# Patient Record
Sex: MALE | Race: WHITE | Employment: UNEMPLOYED | URBAN - METROPOLITAN AREA
[De-identification: names, ages, dates, MRNs, and addresses within clinical notes are randomized per-mention and may not be internally consistent; named-entity substitution may affect disease eponyms.]

---

## 2018-03-02 PROBLEM — Z86.79 H/O: HYPERTENSION: Status: ACTIVE | Noted: 2017-03-10

## 2018-03-02 PROBLEM — I35.0 NONRHEUMATIC AORTIC VALVE STENOSIS: Status: ACTIVE | Noted: 2017-03-10

## 2018-04-17 ENCOUNTER — OFFICE VISIT (OUTPATIENT)
Dept: ELECTROPHYSIOLOGY | Facility: HOSPITAL | Age: 63
End: 2018-04-17
Attending: INTERNAL MEDICINE
Payer: MEDICAID

## 2018-04-17 ENCOUNTER — APPOINTMENT (OUTPATIENT)
Dept: LAB | Age: 63
End: 2018-04-17
Attending: INTERNAL MEDICINE
Payer: MEDICAID

## 2018-04-17 DIAGNOSIS — M06.9 RHEUMATOID ARTHRITIS INVOLVING MULTIPLE SITES, UNSPECIFIED RHEUMATOID FACTOR PRESENCE: ICD-10-CM

## 2018-04-17 DIAGNOSIS — G56.03 BILATERAL CARPAL TUNNEL SYNDROME: ICD-10-CM

## 2018-04-17 DIAGNOSIS — R25.2 LEG CRAMPS: ICD-10-CM

## 2018-04-17 PROCEDURE — 86431 RHEUMATOID FACTOR QUANT: CPT

## 2018-04-17 PROCEDURE — 86140 C-REACTIVE PROTEIN: CPT

## 2018-04-17 PROCEDURE — 95886 MUSC TEST DONE W/N TEST COMP: CPT | Performed by: OTHER

## 2018-04-17 PROCEDURE — 86200 CCP ANTIBODY: CPT

## 2018-04-17 PROCEDURE — 36415 COLL VENOUS BLD VENIPUNCTURE: CPT

## 2018-04-17 PROCEDURE — 80048 BASIC METABOLIC PNL TOTAL CA: CPT

## 2018-04-17 PROCEDURE — 85652 RBC SED RATE AUTOMATED: CPT

## 2018-04-17 PROCEDURE — 95913 NRV CNDJ TEST 13/> STUDIES: CPT | Performed by: OTHER

## 2018-04-17 NOTE — PROCEDURES
Nerve Conduction/Electromyography Report      [facility]   EMG department  [facility]  [address]  [place], [address]  [phone]      Patient name: Angélica Lyon  YOB: 1955  Referring physician: Dr. Bennie Farias  Reason for study: Gabriella De Souzaemelina neuropathy. Serologic workup for polyneuropathy is suggested.   3.  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  4.  There is also suggestion of a superimposed right compressive ulnar mononeuropa

## 2018-05-04 PROBLEM — M54.50 CHRONIC BILATERAL LOW BACK PAIN WITHOUT SCIATICA: Status: ACTIVE | Noted: 2018-05-04

## 2018-05-04 PROBLEM — G89.29 CHRONIC BILATERAL LOW BACK PAIN WITHOUT SCIATICA: Status: ACTIVE | Noted: 2018-05-04

## 2018-05-04 PROBLEM — M54.2 POSTERIOR NECK PAIN: Status: ACTIVE | Noted: 2018-05-04

## 2018-05-04 PROBLEM — I70.0 ABDOMINAL AORTIC ATHEROSCLEROSIS (HCC): Status: ACTIVE | Noted: 2018-05-04

## 2018-06-25 PROCEDURE — 82043 UR ALBUMIN QUANTITATIVE: CPT | Performed by: INTERNAL MEDICINE

## 2018-06-25 PROCEDURE — 82570 ASSAY OF URINE CREATININE: CPT | Performed by: INTERNAL MEDICINE

## 2018-09-05 PROBLEM — E11.65 UNCONTROLLED TYPE 2 DIABETES MELLITUS WITH HYPERGLYCEMIA, WITH LONG-TERM CURRENT USE OF INSULIN (HCC): Status: ACTIVE | Noted: 2018-09-05

## 2018-09-05 PROBLEM — Z79.4 UNCONTROLLED TYPE 2 DIABETES MELLITUS WITH HYPERGLYCEMIA, WITH LONG-TERM CURRENT USE OF INSULIN (HCC): Status: ACTIVE | Noted: 2018-09-05

## 2018-09-05 PROBLEM — I10 ESSENTIAL HYPERTENSION: Status: ACTIVE | Noted: 2018-09-05

## 2018-09-28 PROBLEM — Z79.891 CHRONIC PRESCRIPTION OPIATE USE: Status: ACTIVE | Noted: 2018-09-28

## 2018-10-11 PROCEDURE — 82565 ASSAY OF CREATININE: CPT | Performed by: INTERNAL MEDICINE

## 2018-10-11 PROCEDURE — 82310 ASSAY OF CALCIUM: CPT | Performed by: INTERNAL MEDICINE

## 2018-10-11 PROCEDURE — 82570 ASSAY OF URINE CREATININE: CPT | Performed by: INTERNAL MEDICINE

## 2018-10-11 PROCEDURE — 83970 ASSAY OF PARATHORMONE: CPT | Performed by: INTERNAL MEDICINE

## 2018-10-11 PROCEDURE — 84156 ASSAY OF PROTEIN URINE: CPT | Performed by: INTERNAL MEDICINE

## 2018-10-11 PROCEDURE — 84100 ASSAY OF PHOSPHORUS: CPT | Performed by: INTERNAL MEDICINE

## 2018-10-11 PROCEDURE — 81001 URINALYSIS AUTO W/SCOPE: CPT | Performed by: INTERNAL MEDICINE

## 2018-10-11 PROCEDURE — 82043 UR ALBUMIN QUANTITATIVE: CPT | Performed by: INTERNAL MEDICINE

## 2018-10-26 PROCEDURE — 87340 HEPATITIS B SURFACE AG IA: CPT | Performed by: INTERNAL MEDICINE

## 2018-10-26 PROCEDURE — 87389 HIV-1 AG W/HIV-1&-2 AB AG IA: CPT | Performed by: INTERNAL MEDICINE

## 2018-10-26 PROCEDURE — 84165 PROTEIN E-PHORESIS SERUM: CPT | Performed by: INTERNAL MEDICINE

## 2018-10-26 PROCEDURE — 86160 COMPLEMENT ANTIGEN: CPT | Performed by: INTERNAL MEDICINE

## 2018-10-26 PROCEDURE — 86803 HEPATITIS C AB TEST: CPT | Performed by: INTERNAL MEDICINE

## 2018-10-26 PROCEDURE — 84540 ASSAY OF URINE/UREA-N: CPT | Performed by: INTERNAL MEDICINE

## 2018-10-26 PROCEDURE — 83883 ASSAY NEPHELOMETRY NOT SPEC: CPT | Performed by: INTERNAL MEDICINE

## 2018-10-26 PROCEDURE — 86334 IMMUNOFIX E-PHORESIS SERUM: CPT | Performed by: INTERNAL MEDICINE

## 2018-10-26 PROCEDURE — 86704 HEP B CORE ANTIBODY TOTAL: CPT | Performed by: INTERNAL MEDICINE

## 2018-10-26 PROCEDURE — 84300 ASSAY OF URINE SODIUM: CPT | Performed by: INTERNAL MEDICINE

## 2018-10-26 PROCEDURE — 86706 HEP B SURFACE ANTIBODY: CPT | Performed by: INTERNAL MEDICINE

## 2018-10-26 PROCEDURE — 86225 DNA ANTIBODY NATIVE: CPT | Performed by: INTERNAL MEDICINE

## 2018-10-26 PROCEDURE — 84156 ASSAY OF PROTEIN URINE: CPT | Performed by: INTERNAL MEDICINE

## 2018-10-26 PROCEDURE — 82570 ASSAY OF URINE CREATININE: CPT | Performed by: INTERNAL MEDICINE

## 2018-10-26 PROCEDURE — 86335 IMMUNFIX E-PHORSIS/URINE/CSF: CPT | Performed by: INTERNAL MEDICINE

## 2019-03-05 PROCEDURE — 84100 ASSAY OF PHOSPHORUS: CPT | Performed by: INTERNAL MEDICINE

## 2019-03-05 PROCEDURE — 82310 ASSAY OF CALCIUM: CPT | Performed by: INTERNAL MEDICINE

## 2019-03-05 PROCEDURE — 84156 ASSAY OF PROTEIN URINE: CPT | Performed by: INTERNAL MEDICINE

## 2019-03-05 PROCEDURE — 81001 URINALYSIS AUTO W/SCOPE: CPT | Performed by: INTERNAL MEDICINE

## 2019-03-05 PROCEDURE — 82565 ASSAY OF CREATININE: CPT | Performed by: INTERNAL MEDICINE

## 2019-03-05 PROCEDURE — 82570 ASSAY OF URINE CREATININE: CPT | Performed by: INTERNAL MEDICINE

## 2019-03-05 PROCEDURE — 83970 ASSAY OF PARATHORMONE: CPT | Performed by: INTERNAL MEDICINE

## 2019-07-05 PROBLEM — M15.9 PRIMARY OSTEOARTHRITIS INVOLVING MULTIPLE JOINTS: Status: ACTIVE | Noted: 2019-07-05

## 2019-08-01 ENCOUNTER — APPOINTMENT (OUTPATIENT)
Dept: GENERAL RADIOLOGY | Facility: HOSPITAL | Age: 64
DRG: 871 | End: 2019-08-01
Attending: EMERGENCY MEDICINE
Payer: MEDICAID

## 2019-08-01 ENCOUNTER — APPOINTMENT (OUTPATIENT)
Dept: CT IMAGING | Facility: HOSPITAL | Age: 64
DRG: 871 | End: 2019-08-01
Attending: EMERGENCY MEDICINE
Payer: MEDICAID

## 2019-08-01 ENCOUNTER — HOSPITAL ENCOUNTER (INPATIENT)
Facility: HOSPITAL | Age: 64
LOS: 3 days | Discharge: HOME | DRG: 871 | End: 2019-08-05
Attending: EMERGENCY MEDICINE | Admitting: HOSPITALIST
Payer: MEDICAID

## 2019-08-01 DIAGNOSIS — Z72.0 TOBACCO ABUSE: ICD-10-CM

## 2019-08-01 DIAGNOSIS — R09.02 HYPOXIA: ICD-10-CM

## 2019-08-01 DIAGNOSIS — N28.9 ACUTE RENAL INSUFFICIENCY: ICD-10-CM

## 2019-08-01 DIAGNOSIS — E87.1 HYPONATREMIA: ICD-10-CM

## 2019-08-01 DIAGNOSIS — R77.8 ELEVATED TROPONIN: ICD-10-CM

## 2019-08-01 DIAGNOSIS — I48.91 ATRIAL FIBRILLATION WITH RAPID VENTRICULAR RESPONSE (HCC): ICD-10-CM

## 2019-08-01 DIAGNOSIS — J18.1 CONSOLIDATION OF LEFT LOWER LOBE OF LUNG (HCC): Primary | ICD-10-CM

## 2019-08-01 PROBLEM — R73.9 HYPERGLYCEMIA: Status: ACTIVE | Noted: 2019-08-01

## 2019-08-01 PROBLEM — E87.3 METABOLIC ALKALOSIS: Status: ACTIVE | Noted: 2019-08-01

## 2019-08-01 PROBLEM — R79.89 AZOTEMIA: Status: ACTIVE | Noted: 2019-08-01

## 2019-08-01 PROBLEM — E87.3 RESPIRATORY ALKALOSIS: Status: ACTIVE | Noted: 2019-08-01

## 2019-08-01 PROBLEM — D69.6 THROMBOCYTOPENIA (HCC): Status: ACTIVE | Noted: 2019-08-01

## 2019-08-01 PROBLEM — N17.9 ACUTE KIDNEY INJURY (HCC): Status: ACTIVE | Noted: 2019-08-01

## 2019-08-01 LAB
ALBUMIN SERPL-MCNC: 2.6 G/DL (ref 3.4–5)
ALBUMIN/GLOB SERPL: 0.5 {RATIO} (ref 1–2)
ALLENS TEST: POSITIVE
ALP LIVER SERPL-CCNC: 68 U/L (ref 45–117)
ALT SERPL-CCNC: 43 U/L (ref 16–61)
ANION GAP SERPL CALC-SCNC: 10 MMOL/L (ref 0–18)
APTT PPP: 42.2 SECONDS (ref 25.4–36.1)
ARTERIAL BLD GAS O2 SATURATION: 98 % (ref 92–100)
ARTERIAL BLOOD GAS BASE EXCESS: -2 MMOL/L (ref ?–2)
ARTERIAL BLOOD GAS HCO3: 20.5 MEQ/L (ref 22–26)
ARTERIAL BLOOD GAS PCO2: 29 MM HG (ref 35–45)
ARTERIAL BLOOD GAS PH: 7.47 (ref 7.35–7.45)
ARTERIAL BLOOD GAS PO2: 427 MM HG (ref 80–105)
AST SERPL-CCNC: 73 U/L (ref 15–37)
BASOPHILS # BLD: 0 X10(3) UL (ref 0–0.2)
BASOPHILS NFR BLD: 0 %
BILIRUB SERPL-MCNC: 1 MG/DL (ref 0.1–2)
BUN BLD-MCNC: 52 MG/DL (ref 7–18)
BUN/CREAT SERPL: 21.4 (ref 10–20)
CALCIUM BLD-MCNC: 9.1 MG/DL (ref 8.5–10.1)
CALCULATED O2 SATURATION: 100 % (ref 92–100)
CARBOXYHEMOGLOBIN: 0.8 % SAT (ref 0–3)
CHLORIDE SERPL-SCNC: 92 MMOL/L (ref 98–112)
CO2 SERPL-SCNC: 26 MMOL/L (ref 21–32)
CREAT BLD-MCNC: 2.43 MG/DL (ref 0.7–1.3)
DEPRECATED RDW RBC AUTO: 41.1 FL (ref 35.1–46.3)
EOSINOPHIL # BLD: 0 X10(3) UL (ref 0–0.7)
EOSINOPHIL NFR BLD: 0 %
ERYTHROCYTE [DISTWIDTH] IN BLOOD BY AUTOMATED COUNT: 12.5 % (ref 11–15)
EXPIRATORY PRESSURE: 6 CM H2O
FIO2: 100 %
GLOBULIN PLAS-MCNC: 4.8 G/DL (ref 2.8–4.4)
GLUCOSE BLD-MCNC: 229 MG/DL (ref 70–99)
GLUCOSE BLD-MCNC: 245 MG/DL (ref 70–99)
HCT VFR BLD AUTO: 34.7 % (ref 39–53)
HGB BLD-MCNC: 12 G/DL (ref 13–17.5)
INR BLD: 1.61 (ref 0.9–1.1)
INSP PRESSURE: 14 CM H2O
LYMPHOCYTES NFR BLD: 0.16 X10(3) UL (ref 1–4)
LYMPHOCYTES NFR BLD: 2 %
M PROTEIN MFR SERPL ELPH: 7.4 G/DL (ref 6.4–8.2)
MCH RBC QN AUTO: 31.1 PG (ref 26–34)
MCHC RBC AUTO-ENTMCNC: 34.6 G/DL (ref 31–37)
MCV RBC AUTO: 89.9 FL (ref 80–100)
METHEMOGLOBIN: 0.6 % SAT (ref 0.4–1.5)
MONOCYTES # BLD: 0.32 X10(3) UL (ref 0.1–1)
MONOCYTES NFR BLD: 4 %
MORPHOLOGY: NORMAL
NEUTROPHILS # BLD AUTO: 7.26 X10 (3) UL (ref 1.5–7.7)
NEUTROPHILS NFR BLD: 43 %
NEUTS BAND NFR BLD: 51 %
NEUTS HYPERSEG # BLD: 7.43 X10(3) UL (ref 1.5–7.7)
OSMOLALITY SERPL CALC.SUM OF ELEC: 287 MOSM/KG (ref 275–295)
P/F RATIO: 424.5 MMHG
PATIENT TEMPERATURE: 99.5 F
PLATELET # BLD AUTO: 134 10(3)UL (ref 150–450)
PLATELET MORPHOLOGY: NORMAL
POTASSIUM SERPL-SCNC: 3.7 MMOL/L (ref 3.5–5.1)
PSA SERPL DL<=0.01 NG/ML-MCNC: 20 SECONDS (ref 12.5–14.7)
RBC # BLD AUTO: 3.86 X10(6)UL (ref 4.3–5.7)
SODIUM SERPL-SCNC: 128 MMOL/L (ref 136–145)
TOTAL CELLS COUNTED: 100
TOTAL HEMOGLOBIN: 12 G/DL (ref 13.2–17.3)
TROPONIN I SERPL-MCNC: 0.1 NG/ML (ref ?–0.04)
WBC # BLD AUTO: 7.9 X10(3) UL (ref 4–11)

## 2019-08-01 PROCEDURE — 71045 X-RAY EXAM CHEST 1 VIEW: CPT | Performed by: EMERGENCY MEDICINE

## 2019-08-01 PROCEDURE — 71250 CT THORAX DX C-: CPT | Performed by: EMERGENCY MEDICINE

## 2019-08-01 RX ORDER — SODIUM CHLORIDE 9 MG/ML
125 INJECTION, SOLUTION INTRAVENOUS CONTINUOUS
Status: DISCONTINUED | OUTPATIENT
Start: 2019-08-01 | End: 2019-08-02

## 2019-08-01 RX ORDER — MORPHINE SULFATE 4 MG/ML
2 INJECTION, SOLUTION INTRAMUSCULAR; INTRAVENOUS ONCE
Status: COMPLETED | OUTPATIENT
Start: 2019-08-01 | End: 2019-08-01

## 2019-08-01 RX ORDER — MORPHINE SULFATE 4 MG/ML
INJECTION, SOLUTION INTRAMUSCULAR; INTRAVENOUS
Status: COMPLETED
Start: 2019-08-01 | End: 2019-08-01

## 2019-08-02 ENCOUNTER — APPOINTMENT (OUTPATIENT)
Dept: CV DIAGNOSTICS | Facility: HOSPITAL | Age: 64
DRG: 871 | End: 2019-08-02
Attending: INTERNAL MEDICINE
Payer: MEDICAID

## 2019-08-02 ENCOUNTER — APPOINTMENT (OUTPATIENT)
Dept: GENERAL RADIOLOGY | Facility: HOSPITAL | Age: 64
DRG: 871 | End: 2019-08-02
Attending: INTERNAL MEDICINE
Payer: MEDICAID

## 2019-08-02 PROBLEM — R09.02 HYPOXIA: Status: ACTIVE | Noted: 2019-08-02

## 2019-08-02 PROBLEM — N28.9 ACUTE RENAL INSUFFICIENCY: Status: ACTIVE | Noted: 2019-08-02

## 2019-08-02 PROBLEM — R77.8 ELEVATED TROPONIN: Status: ACTIVE | Noted: 2019-08-02

## 2019-08-02 PROBLEM — I48.91 ATRIAL FIBRILLATION WITH RAPID VENTRICULAR RESPONSE (HCC): Status: ACTIVE | Noted: 2019-08-02

## 2019-08-02 PROBLEM — Z72.0 TOBACCO ABUSE: Status: ACTIVE | Noted: 2019-08-02

## 2019-08-02 LAB
ADENOVIRUS PCR:: NEGATIVE
ALLENS TEST: POSITIVE
ANION GAP SERPL CALC-SCNC: 11 MMOL/L (ref 0–18)
ARTERIAL BLD GAS O2 SATURATION: 93 % (ref 92–100)
ARTERIAL BLOOD GAS BASE EXCESS: -2.4 MMOL/L (ref ?–2)
ARTERIAL BLOOD GAS HCO3: 21.7 MEQ/L (ref 22–26)
ARTERIAL BLOOD GAS PCO2: 36 MM HG (ref 35–45)
ARTERIAL BLOOD GAS PH: 7.4 (ref 7.35–7.45)
ARTERIAL BLOOD GAS PO2: 74 MM HG (ref 80–105)
ATRIAL RATE: 58 BPM
B PERT DNA SPEC QL NAA+PROBE: NEGATIVE
BILIRUB UR QL STRIP.AUTO: NEGATIVE
BUN BLD-MCNC: 64 MG/DL (ref 7–18)
BUN/CREAT SERPL: 22.9 (ref 10–20)
C PNEUM DNA SPEC QL NAA+PROBE: NEGATIVE
CALCIUM BLD-MCNC: 9.2 MG/DL (ref 8.5–10.1)
CALCULATED O2 SATURATION: 94 % (ref 92–100)
CARBOXYHEMOGLOBIN: 0.9 % SAT (ref 0–3)
CHLORIDE SERPL-SCNC: 95 MMOL/L (ref 98–112)
CO2 SERPL-SCNC: 23 MMOL/L (ref 21–32)
CORONAVIRUS 229E PCR:: NEGATIVE
CORONAVIRUS HKU1 PCR:: NEGATIVE
CORONAVIRUS NL63 PCR:: NEGATIVE
CORONAVIRUS OC43 PCR:: NEGATIVE
CREAT BLD-MCNC: 2.79 MG/DL (ref 0.7–1.3)
CREAT UR-SCNC: 137 MG/DL
DEPRECATED RDW RBC AUTO: 42.1 FL (ref 35.1–46.3)
ERYTHROCYTE [DISTWIDTH] IN BLOOD BY AUTOMATED COUNT: 12.7 % (ref 11–15)
EST. AVERAGE GLUCOSE BLD GHB EST-MCNC: 203 MG/DL (ref 68–126)
FLUAV RNA SPEC QL NAA+PROBE: NEGATIVE
FLUBV RNA SPEC QL NAA+PROBE: NEGATIVE
GLUCOSE BLD-MCNC: 227 MG/DL (ref 70–99)
GLUCOSE BLD-MCNC: 233 MG/DL (ref 70–99)
GLUCOSE BLD-MCNC: 245 MG/DL (ref 70–99)
GLUCOSE BLD-MCNC: 304 MG/DL (ref 70–99)
GLUCOSE BLD-MCNC: 331 MG/DL (ref 70–99)
GLUCOSE BLD-MCNC: 358 MG/DL (ref 70–99)
GLUCOSE UR STRIP.AUTO-MCNC: NEGATIVE MG/DL
HBA1C MFR BLD HPLC: 8.7 % (ref ?–5.7)
HCT VFR BLD AUTO: 34 % (ref 39–53)
HGB BLD-MCNC: 11.6 G/DL (ref 13–17.5)
INR BLD: 1.31 (ref 0.9–1.1)
KETONES UR STRIP.AUTO-MCNC: NEGATIVE MG/DL
L PNEUMO AG UR QL: POSITIVE
L/M: 3 L/MIN
LEUKOCYTE ESTERASE UR QL STRIP.AUTO: NEGATIVE
MCH RBC QN AUTO: 30.8 PG (ref 26–34)
MCHC RBC AUTO-ENTMCNC: 34.1 G/DL (ref 31–37)
MCV RBC AUTO: 90.2 FL (ref 80–100)
METAPNEUMOVIRUS PCR:: NEGATIVE
METHEMOGLOBIN: 0.6 % SAT (ref 0.4–1.5)
MYCOPLASMA PNEUMONIA PCR:: NEGATIVE
NITRITE UR QL STRIP.AUTO: NEGATIVE
OSMOLALITY SERPL CALC.SUM OF ELEC: 294 MOSM/KG (ref 275–295)
P AXIS: 162 DEGREES
P-R INTERVAL: 248 MS
PARAINFLUENZA 1 PCR:: NEGATIVE
PARAINFLUENZA 2 PCR:: NEGATIVE
PARAINFLUENZA 3 PCR:: NEGATIVE
PARAINFLUENZA 4 PCR:: NEGATIVE
PATIENT TEMPERATURE: 100.3 F
PH UR STRIP.AUTO: 5 [PH] (ref 4.5–8)
PLATELET # BLD AUTO: 126 10(3)UL (ref 150–450)
POTASSIUM SERPL-SCNC: 3.5 MMOL/L (ref 3.5–5.1)
PROCALCITONIN SERPL-MCNC: 13.8 NG/ML
PROT UR STRIP.AUTO-MCNC: 100 MG/DL
PSA SERPL DL<=0.01 NG/ML-MCNC: 16.9 SECONDS (ref 12.5–14.7)
Q-T INTERVAL: 358 MS
QRS DURATION: 94 MS
QTC CALCULATION (BEZET): 468 MS
R AXIS: -22 DEGREES
RBC # BLD AUTO: 3.77 X10(6)UL (ref 4.3–5.7)
RHINOVIRUS/ENTERO PCR:: NEGATIVE
RSV RNA SPEC QL NAA+PROBE: NEGATIVE
SODIUM SERPL-SCNC: 11 MMOL/L
SODIUM SERPL-SCNC: 129 MMOL/L (ref 136–145)
SP GR UR STRIP.AUTO: 1.02 (ref 1–1.03)
STREP PNEUMO ANTIGEN, URINE: NEGATIVE
T AXIS: 59 DEGREES
TOTAL HEMOGLOBIN: 11.5 G/DL (ref 13.2–17.3)
TROPONIN I SERPL-MCNC: 0.1 NG/ML (ref ?–0.04)
UROBILINOGEN UR STRIP.AUTO-MCNC: <2 MG/DL
VENTRICULAR RATE: 103 BPM
WBC # BLD AUTO: 7.1 X10(3) UL (ref 4–11)

## 2019-08-02 PROCEDURE — 93306 TTE W/DOPPLER COMPLETE: CPT | Performed by: INTERNAL MEDICINE

## 2019-08-02 PROCEDURE — 74018 RADEX ABDOMEN 1 VIEW: CPT | Performed by: INTERNAL MEDICINE

## 2019-08-02 PROCEDURE — 99291 CRITICAL CARE FIRST HOUR: CPT | Performed by: NURSE PRACTITIONER

## 2019-08-02 RX ORDER — SODIUM CHLORIDE 9 MG/ML
INJECTION, SOLUTION INTRAVENOUS CONTINUOUS
Status: DISCONTINUED | OUTPATIENT
Start: 2019-08-02 | End: 2019-08-02

## 2019-08-02 RX ORDER — METOPROLOL TARTRATE 50 MG/1
50 TABLET, FILM COATED ORAL
Status: DISCONTINUED | OUTPATIENT
Start: 2019-08-02 | End: 2019-08-04

## 2019-08-02 RX ORDER — POTASSIUM CHLORIDE 14.9 MG/ML
20 INJECTION INTRAVENOUS ONCE
Status: COMPLETED | OUTPATIENT
Start: 2019-08-02 | End: 2019-08-02

## 2019-08-02 RX ORDER — ONDANSETRON 2 MG/ML
4 INJECTION INTRAMUSCULAR; INTRAVENOUS EVERY 4 HOURS PRN
Status: DISCONTINUED | OUTPATIENT
Start: 2019-08-02 | End: 2019-08-02 | Stop reason: HOSPADM

## 2019-08-02 RX ORDER — ONDANSETRON 2 MG/ML
4 INJECTION INTRAMUSCULAR; INTRAVENOUS EVERY 6 HOURS PRN
Status: DISCONTINUED | OUTPATIENT
Start: 2019-08-02 | End: 2019-08-05

## 2019-08-02 RX ORDER — IPRATROPIUM BROMIDE AND ALBUTEROL SULFATE 2.5; .5 MG/3ML; MG/3ML
3 SOLUTION RESPIRATORY (INHALATION) EVERY 6 HOURS PRN
Status: DISCONTINUED | OUTPATIENT
Start: 2019-08-02 | End: 2019-08-05

## 2019-08-02 RX ORDER — METOCLOPRAMIDE HYDROCHLORIDE 5 MG/ML
5 INJECTION INTRAMUSCULAR; INTRAVENOUS EVERY 8 HOURS PRN
Status: DISCONTINUED | OUTPATIENT
Start: 2019-08-02 | End: 2019-08-05

## 2019-08-02 RX ORDER — ACETAMINOPHEN 500 MG
1000 TABLET ORAL EVERY 6 HOURS PRN
Status: DISCONTINUED | OUTPATIENT
Start: 2019-08-02 | End: 2019-08-05

## 2019-08-02 RX ORDER — ATORVASTATIN CALCIUM 40 MG/1
40 TABLET, FILM COATED ORAL NIGHTLY
Status: DISCONTINUED | OUTPATIENT
Start: 2019-08-02 | End: 2019-08-05

## 2019-08-02 RX ORDER — CARVEDILOL 12.5 MG/1
25 TABLET ORAL 2 TIMES DAILY WITH MEALS
Status: DISCONTINUED | OUTPATIENT
Start: 2019-08-02 | End: 2019-08-02

## 2019-08-02 RX ORDER — IPRATROPIUM BROMIDE AND ALBUTEROL SULFATE 2.5; .5 MG/3ML; MG/3ML
3 SOLUTION RESPIRATORY (INHALATION)
Status: DISCONTINUED | OUTPATIENT
Start: 2019-08-02 | End: 2019-08-02

## 2019-08-02 RX ORDER — METHYLPREDNISOLONE SODIUM SUCCINATE 125 MG/2ML
60 INJECTION, POWDER, LYOPHILIZED, FOR SOLUTION INTRAMUSCULAR; INTRAVENOUS EVERY 8 HOURS
Status: DISCONTINUED | OUTPATIENT
Start: 2019-08-02 | End: 2019-08-05

## 2019-08-02 RX ORDER — DEXTROSE MONOHYDRATE 25 G/50ML
50 INJECTION, SOLUTION INTRAVENOUS
Status: DISCONTINUED | OUTPATIENT
Start: 2019-08-02 | End: 2019-08-05

## 2019-08-02 RX ORDER — SODIUM CHLORIDE 9 MG/ML
INJECTION, SOLUTION INTRAVENOUS CONTINUOUS
Status: DISCONTINUED | OUTPATIENT
Start: 2019-08-02 | End: 2019-08-04

## 2019-08-02 RX ORDER — HYDROCODONE BITARTRATE AND ACETAMINOPHEN 5; 325 MG/1; MG/1
1 TABLET ORAL EVERY 8 HOURS PRN
Status: DISCONTINUED | OUTPATIENT
Start: 2019-08-02 | End: 2019-08-04

## 2019-08-02 NOTE — ED PROVIDER NOTES
Patient Seen in: [facility] Emergency Department    History   Patient presents with:  Dyspnea MAYRA SOB (respiratory)    Stated Complaint: MAYRA    HPI    60 yo man presents to the emergency department from the [facility] urgent care, he went there with diffic Paramedics are transferring the patient to the bed, he is obese and tachypnea he is not on his CPAP, his HEENT exam reveals very dry oral mucosa his neck is supple without JVD his heart has a rapid irregularly irregular rhythm his abdomen is obese soft non HCT 34.7 (*)     .0 (*)     All other components within normal limits   CBC WITH DIFFERENTIAL WITH PLATELET    Narrative: The following orders were created for panel order CBC WITH DIFFERENTIAL WITH PLATELET.   Procedure window lymph node measures 1.0 x 0.6 cm (image     54). A representative paratracheal lymph node measures 1.3 x 0.8 cm     (image 53). CARDIAC:  Coronary arterial calcifications are noted. Mitral valve     calcifications are noted.     PLEURA:  Tiny le with RVR now stabilized on Cardizem drip, hypoxia was managed with BiPAP quite well, patient is a large left lung consolidation, given that he had a fever on Tuesday, possibly this is pneumonia however given his everyday smoking history of concern that [facility]

## 2019-08-02 NOTE — CONSULTS
[facility] Cardiology  Consultation Note      Junior Mondragon Patient Status:  Inpatient    1955 MRN [id number]   [facility] 4SW-A Attending Maxim Mata,    Hosp Day # 0 PCP Maggy Blue DO     Outpatient card Glucose-Vitamin C (DEX-4) 4-6 GM-MG chewable tab 4 tablet 4 tablet Oral Q15 Min PRN   Or      dextrose 50 % injection 50 mL 50 mL Intravenous Q15 Min PRN   Or      glucose (DEX4) oral liquid 30 g 30 g Oral Q15 Min PRN   Or      Glucose-Vitamin C (DEX-4) dyspnea on exertion  Cardiovascular: negative for chest pain  Gastrointestinal: negative for melena  Genitourinary:negative for hematuria  Hematologic/lymphatic: negative for bleeding  Musculoskeletal:negative for myalgias  Neurological: negative for dizzi 1.0 08/01/2019    TP 7.4 08/01/2019    AST 73 08/01/2019    ALT 43 08/01/2019    PTT 42.2 08/01/2019    INR 1.31 08/02/2019    PTP 16.9 08/02/2019    TROP 0.105 08/02/2019    PGLU 245 08/02/2019         Thank you for allowing our practice to participate in 177.8

## 2019-08-02 NOTE — PROGRESS NOTES
ICU  Critical Care APRN Progress Note    NAME: Sally Moreau - ROOM: [address] [place] [address] - MRN: [id number] - Age: 59year old - :1955    History Of Present Illness:  Sally Moreau is a 59year old male with PMHx significant for Afib on Xarelto, DM2, HTN review of systems was completed. Pertinent positives and negatives noted in the HPI.     OBJECTIVE  Vitals:  /75   Pulse 86   Temp 100.3 °F (37.9 °C) (Temporal)   Resp 14   Ht 175.3 cm (5' 9\")   Wt 207 lb 14.3 oz (94.3 kg)   SpO2 96%   BMI 30.70 kg/ Labs:  Lab Results   Component Value Date    WBC 7.9 08/01/2019    HGB 12.0 08/01/2019    HCT 34.7 08/01/2019    .0 08/01/2019    CREATSERUM 2.43 08/01/2019    BUN 52 08/01/2019     08/01/2019    K 3.7 08/01/2019    CL 92 08/01/2019

## 2019-08-02 NOTE — CONSULTS
[facility] - Nephrology  Report of Consultation    Cynthia Burns Patient Status:  Inpatient    1955 MRN [id number]   [facility] 4SW-A Attending Deisy Martínez DO   Hosp Day # 0 PCP Maggy Benitez DO     Reason for liquid 15 g, 15 g, Oral, Q15 Min PRN **OR** Glucose-Vitamin C (DEX-4) 4-6 GM-MG chewable tab 4 tablet, 4 tablet, Oral, Q15 Min PRN **OR** dextrose 50 % injection 50 mL, 50 mL, Intravenous, Q15 Min PRN **OR** glucose (DEX4) oral liquid 30 g, 30 g, Oral, Q15 1.8MG  SUBCUTANEOUSLY ONCE DAILY Disp: 27 mL Rfl: 1 8/1/2019 at Unknown time   insulin glargine (BASAGLAR KWIKPEN) 100 UNIT/ML Subcutaneous Solution Pen-injector INJECT 50 UNITS SUBCUTANEOUSLY ONCE DAILY Disp: 15 mL Rfl: 3 8/1/2019 at Unknown time   HYDROc (36.9 °C) (Temporal)   Resp 24   Ht 5' 9\" (1.753 m)   Wt 207 lb 14.3 oz (94.3 kg)   SpO2 99%   BMI 30.70 kg/m²   Temp (24hrs), Av.2 °F (37.9 °C), Min:98.5 °F (36.9 °C), Max:103.7 °F (39.8 °C)       Intake/Output Summary (Last 24 hours) at 2019 12 0.6 10/11/2018    NE 6.66 10/11/2018    LYMABS 1.95 10/11/2018    MOABSO 0.87 (H) 10/11/2018    EOABSO 0.23 10/11/2018    BAABSO 0.06 10/11/2018     Lab Results   Component Value Date    MALBP 15.4 03/05/2019    CREUR 45.78 03/05/2019    CREUR 42.10 03/05/

## 2019-08-02 NOTE — RESPIRATORY THERAPY NOTE
Patient taken off bipap and placed on 3L NC. Saturating into the mid to high 90's, no signs of distress. Will continue to monitor.

## 2019-08-02 NOTE — H&P
[facility] Hospitalist History and Physical      Patient presents with:  Dyspnea MAYRA SOB (respiratory)       PCP: Maggy Stewart DO      History of Present Illness: Patient is a 59year old male with PMH sig for afib, HTN, DM2 c/b diabetic retinopathy, and bleeding. OBJECTIVE:  BP 91/64   Pulse 82   Temp 98.5 °F (36.9 °C) (Temporal)   Resp 23   Ht 5' 9\" (1.753 m)   Wt 207 lb 14.3 oz (94.3 kg)   SpO2 100%   BMI 30.70 kg/m²   General:  Alert, no distress, appears stated age.      Head:  Normocephalic, with HISTORY: (As transcribed by Technologist)  MAYRA    FINDINGS:  LUNGS:  There is a large left upper lobe consolidation along with smaller patchy reticular nodular densities in consolidations in bilateral lower lobes.   Lesser extent reticular densities with mi complaints of difficulty breathing. FINDINGS:   Marked large consolidation throughout the left lung. Cardiac silhouette is mildly enlarged. No pneumothorax. CONCLUSION:  Large left lung consolidation.     Dictated by: Natty Ordaz MD on 8/01

## 2019-08-02 NOTE — RESPIRATORY THERAPY NOTE
Patient received in ER B2 on EMS CPAP mask. He was immediately switched to our Bipap 14/6/100%. Breath sounds diminished with crackles. ABG was drawn on Bipap:   ABG pH 7. 47High     ABG pCO2 29Low  mm Hg    ABG pO2 427High  mm Hg    ABG HCO3 20.5Low  mEq/L

## 2019-08-02 NOTE — PLAN OF CARE
Received pt. From ER on BIPAP. Switched NC and currently at [address] Lungs diminshed. Does IS up to 1500. Was intermittently confused last night about everything. Needed to repeat information multiple times. This am is completely alert and oriented x4.   R

## 2019-08-02 NOTE — PLAN OF CARE
Patient complaining of SOB placed back on Bi-pap Neb PRN given slight inspiratory wheeze noted on L. Will continue to monitor.

## 2019-08-02 NOTE — CONSULTS
Pulmonary H&P/Consult       NAME: Mayi Vega - ROOM: [id number] - MRN: [id number] - Age: 59year old - :  1955    Date of Admission: 2019  8:20 PM  Admission Diagnosis: Hyponatremia [E87.1]  Tobacco abuse [Z72.0]  Acute renal insufficiency daily. Disp:  Rfl:  8/1/2019 at Unknown time   Ondansetron HCl (ZOFRAN) 4 mg tablet Take 1 tablet (4 mg total) by mouth every 8 (eight) hours as needed for Nausea.  Disp: 20 tablet Rfl: 2 Past Week at Unknown time   metFORMIN HCl  MG Oral Tablet 24 Hr Disp:  Rfl:  More than a month at Unknown time   Glucose Blood (RITU CONTOUR TEST) In Vitro Strip Inject 1 each into the skin 2 (two) times daily. Disp: 100 strip Rfl: 3 Taking   TraMADol HCl 50 MG Oral Tab Take 1 tablet (50 mg total) by mouth daily.  Disp file    Social History Narrative      Not on file         Family History:  History reviewed. No pertinent family history. Home Medications:    Outpatient Medications Marked as Taking for the 8/1/19 encounter [facility] Encounter):   Multiple Vitamin (MUL Medication:ondansetron HCl, ondansetron HCl, Metoclopramide HCl, glucose **OR** Glucose-Vitamin C **OR** dextrose **OR** glucose **OR** Glucose-Vitamin C, ipratropium-albuterol, acetaminophen     REVIEW OF SYSTEMS:   GENERAL:  feels well otherwise   SKIN: teeth and gums normal   Neck:   Supple, symmetrical, trachea midline, no adenopathy;        thyroid:  No enlargement/tenderness/nodules; no carotid    bruit or JVD   Back:     Symmetric, no curvature, ROM normal, no CVA tenderness   Lungs:     Exp wheeze o left lung  -treat for CAP  -will add steroids given the extent of the disease  -monitor cultures and adjust abx accordingly  2. Hypoxia  -due to above  -wean O2 as tolerated  3.  Hyponatremia  -suspect this is due to lack of intake coupled w/ increased free

## 2019-08-03 LAB
ANION GAP SERPL CALC-SCNC: 9 MMOL/L (ref 0–18)
BILIRUB UR QL STRIP.AUTO: NEGATIVE
BUN BLD-MCNC: 80 MG/DL (ref 7–18)
BUN/CREAT SERPL: 26.9 (ref 10–20)
CALCIUM BLD-MCNC: 8.9 MG/DL (ref 8.5–10.1)
CHLORIDE SERPL-SCNC: 96 MMOL/L (ref 98–112)
CLARITY UR REFRACT.AUTO: CLEAR
CO2 SERPL-SCNC: 24 MMOL/L (ref 21–32)
COLOR UR AUTO: YELLOW
CREAT BLD-MCNC: 2.97 MG/DL (ref 0.7–1.3)
CREAT UR-SCNC: 55.3 MG/DL
CREAT UR-SCNC: 65.7 MG/DL
DEPRECATED RDW RBC AUTO: 42.6 FL (ref 35.1–46.3)
ERYTHROCYTE [DISTWIDTH] IN BLOOD BY AUTOMATED COUNT: 12.8 % (ref 11–15)
GLUCOSE BLD-MCNC: 241 MG/DL (ref 70–99)
GLUCOSE BLD-MCNC: 285 MG/DL (ref 70–99)
GLUCOSE BLD-MCNC: 332 MG/DL (ref 70–99)
GLUCOSE BLD-MCNC: 353 MG/DL (ref 70–99)
GLUCOSE BLD-MCNC: 508 MG/DL (ref 70–99)
GLUCOSE UR STRIP.AUTO-MCNC: >=500 MG/DL
HAV IGM SER QL: 2.9 MG/DL (ref 1.6–2.6)
HCT VFR BLD AUTO: 32 % (ref 39–53)
HGB BLD-MCNC: 10.7 G/DL (ref 13–17.5)
KETONES UR STRIP.AUTO-MCNC: NEGATIVE MG/DL
LEUKOCYTE ESTERASE UR QL STRIP.AUTO: NEGATIVE
MCH RBC QN AUTO: 30.7 PG (ref 26–34)
MCHC RBC AUTO-ENTMCNC: 33.4 G/DL (ref 31–37)
MCV RBC AUTO: 91.7 FL (ref 80–100)
MICROALBUMIN UR-MCNC: 23.5 MG/DL
MICROALBUMIN/CREAT 24H UR-RTO: 357.7 UG/MG (ref ?–30)
NITRITE UR QL STRIP.AUTO: NEGATIVE
OSMOLALITY SERPL CALC.SUM OF ELEC: 302 MOSM/KG (ref 275–295)
OSMOLALITY SERPL: 291 MOSM/KG (ref 280–300)
OSMOLALITY UR: 490 MOSM/KG (ref 300–1300)
PH UR STRIP.AUTO: 5 [PH] (ref 4.5–8)
PHOSPHATE SERPL-MCNC: 4.8 MG/DL (ref 2.5–4.9)
PLATELET # BLD AUTO: 119 10(3)UL (ref 150–450)
POTASSIUM SERPL-SCNC: 4.1 MMOL/L (ref 3.5–5.1)
POTASSIUM UR-SCNC: 15.8 MMOL/L
PROT UR STRIP.AUTO-MCNC: 30 MG/DL
RBC # BLD AUTO: 3.49 X10(6)UL (ref 4.3–5.7)
SODIUM SERPL-SCNC: 11 MMOL/L
SODIUM SERPL-SCNC: 129 MMOL/L (ref 136–145)
SP GR UR STRIP.AUTO: 1.01 (ref 1–1.03)
T3FREE SERPL-MCNC: 0.87 PG/ML (ref 2.4–4.2)
T4 FREE SERPL-MCNC: 1 NG/DL (ref 0.8–1.7)
TSI SER-ACNC: 0.29 MIU/ML (ref 0.36–3.74)
URATE SERPL-MCNC: 13.2 MG/DL (ref 3.5–7.2)
UROBILINOGEN UR STRIP.AUTO-MCNC: <2 MG/DL
WBC # BLD AUTO: 6.9 X10(3) UL (ref 4–11)

## 2019-08-03 RX ORDER — LISINOPRIL 20 MG/1
20 TABLET ORAL DAILY
Status: DISCONTINUED | OUTPATIENT
Start: 2019-08-03 | End: 2019-08-05

## 2019-08-03 RX ORDER — AMLODIPINE BESYLATE 2.5 MG/1
2.5 TABLET ORAL ONCE
Status: COMPLETED | OUTPATIENT
Start: 2019-08-03 | End: 2019-08-03

## 2019-08-03 NOTE — PROGRESS NOTES
Pulmonary Progress Note        NAME: Doris Mays - ROOM: 790/385-N - MRN: [id number] - Age: 59year old - : 1955        Last 24hrs: No events overnight, feeling better, still seems a little off/confused though he maintains that he's not    OBJE 08/03/19  0435   * 129* 129*   K 3.7 3.5 4.1   CL 92* 95* 96*   CO2 26.0 23.0 24.0   BUN 52* 64* 80*   CA 9.1 9.2 8.9   MG  --   --  2.9*   PHOS  --   --  4.8       Recent Labs     08/01/19 2030   ALT 43   AST 73*   ALB 2.6*       Invalid input(s):

## 2019-08-03 NOTE — PROGRESS NOTES
[facility] Cardiology Progress Note - Daren Bryan Patient Status:  Inpatient    1955 MRN [id number]   [facility] 4SW-A Attending Claudette Cade DO   Hosp Day # 1 PCP Maggy Hunter DO     Subjective: Chronic prescription opiate use     Primary osteoarthritis involving multiple joints     Hyponatremia     Thrombocytopenia (HCC)     Acute kidney injury ([address]     Metabolic alkalosis     Azotemia     Respiratory alkalosis     Hyperglycemia     Consolidatio Recent Labs   Lab 08/01/19  2030 08/02/19  0453 08/03/19  0435   * 129* 129*   K 3.7 3.5 4.1   CL 92* 95* 96*   CO2 26.0 23.0 24.0   BUN 52* 64* 80*   CREATSERUM 2.43* 2.79* 2.97*   CA 9.1 9.2 8.9   MG  --   --  2.9*   PHOS  --   --  4.8   GLU visual complaints or deficits  HEENT: denies nasal congestion, sinus pain; hearing loss negative  Respiratory: denies shortness of breath, wheezing or cough   Cardiovascular:  See HPI  GI: denies nausea, vomiting,   Genital/: no dysuria,   Musculoskeleta

## 2019-08-03 NOTE — CM/SW NOTE
SYLVIA acknowledged order for [facility] AT [facility].  MSW paged [facility]  P:[phone]  S:[phone] who will meet with the patient at bedside to explain services, financial disclosure and choice of agency.     Kelley Peng LCSW

## 2019-08-03 NOTE — PLAN OF CARE
Pt received this AM alert & oriented x 3, disoriented to day/year at times, able to follow commands. Lungs are diminished bilaterally on 2L nasal cannula. Afib w/ PVCs. BP stable. Pt diaphoretic, ice packs applied. Carb controlled diet-tolerating well.  Acc - FALL  Goal: Free from fall injury  Description  INTERVENTIONS:  - Assess pt frequently for physical needs  - Identify cognitive and physical deficits and behaviors that affect risk of falls. - [place] fall precautions as indicated by assessment.   - Ed patient to ask for assistance (call light)  - Provide an  as needed  - Communicate barriers and strategies to overcome with those who interact with patient  Outcome: Progressing     Problem: Diabetes/Glucose Control  Goal: Glucose maintained wit

## 2019-08-03 NOTE — HOME CARE LIAISON
Met with patient to offer home health at discharge. Patient wanted to speak with spouse later today. Brochure and contact information provided. Will update after speaking with spouse.

## 2019-08-03 NOTE — PROGRESS NOTES
[facility]    Nephrology Progress Note    Akilah Dawkins Attending:  Paula Mcneal MD     Cc: PILI    SUBJECTIVE     Feels better but po intake still not great  No sob or n/v/d    PHYSICAL EXAM   Vital signs: /84   Pulse 85   Temp 97.9 °F (36. NaCl infusion  Intravenous Continuous   ipratropium-albuterol (DUONEB) nebulizer solution 3 mL 3 mL Nebulization Q6H PRN   atorvastatin (LIPITOR) tab 40 mg 40 mg Oral Nightly   acetaminophen (TYLENOL EXTRA STRENGTH) tab 1,000 mg 1,000 mg Oral Q6H PRN   met volume was moderately increased. 5. Right atrium: The atrium was dilated. Impressions:  No previous study was available for comparison.   *    ASSESSMENT & PLAN   Manjit Jose is a 59year old male with past medical history significant for  DMII, HT

## 2019-08-03 NOTE — HOME CARE LIAISON
Attempted to call spouse and left voice message to offer home health. Waiting to hear back from Juliette Fernandez.

## 2019-08-03 NOTE — PLAN OF CARE
Vital signs stable overnight, able to wean NC 02 down to 3L. No complaints of shortness of breath. Patient diaphoretic throughout the night, no complaints of pain or chills. Low grade fever, tmax 99.8.  Patient up once to the bedside commode, patient states

## 2019-08-03 NOTE — PROGRESS NOTES
RECEIVED PT FROM ICU  TO  BY BED ACCOMPANIED BY ICU RN. PT ALERT AND AWAKE, BP ELEVATED 187/95,HR 97. 02 SATS 92% ON ROOM AIR, PT REPORTS SOB. PT PLACED ON 2LNC, SATS WENT UP TO 97%. PT ALSO NOTED DIAPHORETIC BUT AFEBRILE. PT DENIES ANY OTHER SY

## 2019-08-03 NOTE — PROGRESS NOTES
[facility] hospitalist daily note  Patient was seen/examined on 8/3/19    S; per pt he is feeling better  No chest  Pain, no fever, no abd pain, no nausea, no bleeding, no cough at this time      Medications in Epic    PE    08/03/19  1000   BP: 138/73   Pulse: 9

## 2019-08-04 LAB
ALBUMIN SERPL-MCNC: 2.4 G/DL (ref 3.4–5)
ANION GAP SERPL CALC-SCNC: 9 MMOL/L (ref 0–18)
BUN BLD-MCNC: 65 MG/DL (ref 7–18)
BUN/CREAT SERPL: 31.3 (ref 10–20)
CALCIUM BLD-MCNC: 9.8 MG/DL (ref 8.5–10.1)
CHLORIDE SERPL-SCNC: 105 MMOL/L (ref 98–112)
CO2 SERPL-SCNC: 21 MMOL/L (ref 21–32)
CREAT BLD-MCNC: 2.08 MG/DL (ref 0.7–1.3)
DEPRECATED RDW RBC AUTO: 44.4 FL (ref 35.1–46.3)
ERYTHROCYTE [DISTWIDTH] IN BLOOD BY AUTOMATED COUNT: 13.1 % (ref 11–15)
GLUCOSE BLD-MCNC: 128 MG/DL (ref 70–99)
GLUCOSE BLD-MCNC: 130 MG/DL (ref 70–99)
GLUCOSE BLD-MCNC: 209 MG/DL (ref 70–99)
GLUCOSE BLD-MCNC: 242 MG/DL (ref 70–99)
GLUCOSE BLD-MCNC: 303 MG/DL (ref 70–99)
GLUCOSE BLD-MCNC: 359 MG/DL (ref 70–99)
GLUCOSE BLD-MCNC: 367 MG/DL (ref 70–99)
GLUCOSE BLD-MCNC: 68 MG/DL (ref 70–99)
GLUCOSE BLD-MCNC: 70 MG/DL (ref 70–99)
GLUCOSE BLD-MCNC: 75 MG/DL (ref 70–99)
HAV IGM SER QL: 2.9 MG/DL (ref 1.6–2.6)
HCT VFR BLD AUTO: 36.7 % (ref 39–53)
HGB BLD-MCNC: 12.4 G/DL (ref 13–17.5)
MCH RBC QN AUTO: 31.3 PG (ref 26–34)
MCHC RBC AUTO-ENTMCNC: 33.8 G/DL (ref 31–37)
MCV RBC AUTO: 92.7 FL (ref 80–100)
OSMOLALITY SERPL CALC.SUM OF ELEC: 297 MOSM/KG (ref 275–295)
PHOSPHATE SERPL-MCNC: 3.3 MG/DL (ref 2.5–4.9)
PLATELET # BLD AUTO: 165 10(3)UL (ref 150–450)
POTASSIUM SERPL-SCNC: 3.4 MMOL/L (ref 3.5–5.1)
RBC # BLD AUTO: 3.96 X10(6)UL (ref 4.3–5.7)
SODIUM SERPL-SCNC: 135 MMOL/L (ref 136–145)
WBC # BLD AUTO: 18.4 X10(3) UL (ref 4–11)

## 2019-08-04 RX ORDER — HYDROCODONE BITARTRATE AND ACETAMINOPHEN 5; 325 MG/1; MG/1
1 TABLET ORAL EVERY 4 HOURS PRN
Status: DISCONTINUED | OUTPATIENT
Start: 2019-08-04 | End: 2019-08-05

## 2019-08-04 RX ORDER — TRAZODONE HYDROCHLORIDE 50 MG/1
25 TABLET ORAL NIGHTLY PRN
Status: DISCONTINUED | OUTPATIENT
Start: 2019-08-04 | End: 2019-08-05

## 2019-08-04 RX ORDER — AMLODIPINE BESYLATE 5 MG/1
5 TABLET ORAL DAILY
Status: DISCONTINUED | OUTPATIENT
Start: 2019-08-04 | End: 2019-08-05

## 2019-08-04 RX ORDER — HYDRALAZINE HYDROCHLORIDE 20 MG/ML
10 INJECTION INTRAMUSCULAR; INTRAVENOUS EVERY 4 HOURS PRN
Status: DISCONTINUED | OUTPATIENT
Start: 2019-08-04 | End: 2019-08-05

## 2019-08-04 RX ORDER — CARVEDILOL 12.5 MG/1
25 TABLET ORAL 2 TIMES DAILY WITH MEALS
Status: DISCONTINUED | OUTPATIENT
Start: 2019-08-04 | End: 2019-08-05

## 2019-08-04 RX ORDER — POTASSIUM CHLORIDE 20 MEQ/1
40 TABLET, EXTENDED RELEASE ORAL ONCE
Status: COMPLETED | OUTPATIENT
Start: 2019-08-04 | End: 2019-08-04

## 2019-08-04 NOTE — PLAN OF CARE
Problem: Patient/Family Goals  Goal: Patient/Family Long Term Goal  Description  Patient's Long Term Goal: discharge home with adequate resources    Interventions:  - comply with POC  - See additional Care Plan goals for specific interventions   Outcome: activity based on assessment  - Modify environment to reduce risk of injury  - Provide assistive devices as appropriate  - Consider OT/PT consult to assist with strengthening/mobility  - Encourage toileting schedule  Outcome: Progressing     Problem: [facility] day, abd is soft, +bs/gas, wctm. Pt up with standby assist, BRP, fall precautions in place. IVF infusing, IV abx. QID accucheck- see mar. Frequent rounding, needs met.  PT to eval.  Discharge plan TBD

## 2019-08-04 NOTE — CONSULTS
[facility]  Report of Consultation    Odilon Jaquez Patient Status:  Inpatient    1955 MRN [id number]   [facility] 5NW-A Attending Noah Palm MD   Hosp Day # 2 PCP Maggy Ludin Corporal, DO     Reason for Consultation:  Type Chloride ER (K-DUR M20) CR tab 40 mEq, 40 mEq, Oral, Once  •  azithromycin (ZITHROMAX) 500 mg in sodium chloride 0.9% 250 mL IVPB, 500 mg, Intravenous, Q24H  •  Insulin Aspart Pen (NOVOLOG) 100 UNIT/ML flexpen 1-50 Units, 1-50 Units, Subcutaneous, TID CC anicteric sclera, perrla, eomi  ENT: op clear, no lesions noted, MMM  Neck: supple, no thyromegaly  Lymph: no neck or supraclavicular lymphadenopathy  Cardiovascular:  RRR  Lungs: poor air movement   Abd: soft, nontender, nondistended, active bowel sounds insulin ([facility] [address]     Essential hypertension     Chronic prescription opiate use     Primary osteoarthritis involving multiple joints     Hyponatremia     Thrombocytopenia (HCC)     Acute kidney injury ([facility] [address]     Metabolic alkalosis     Azotemia     Respiratory

## 2019-08-04 NOTE — PROGRESS NOTES
[facility]    Nephrology Progress Note    Marlen Cedillo Attending:  Genie Osler, MD     Cc: PILI    SUBJECTIVE     BP elevated overnight, IVFs stopped, BP meds added  Drinking a lot of caffeine   No sob or n/v/d    PHYSICAL EXAM   Vital signs: BP Subcutaneous Once   ondansetron HCl (ZOFRAN) injection 4 mg 4 mg Intravenous Q6H PRN   Metoclopramide HCl (REGLAN) injection 5 mg 5 mg Intravenous Q8H PRN   glucose (DEX4) oral liquid 15 g 15 g Oral Q15 Min PRN   Or      Glucose-Vitamin C (DEX-4) 4-6 GM-MG valve: Mitral valve leaflets were poorly visualized. Rheumatic     mitral scarring can not be ruled out. Moderately calcified, moderately     fibrotic, moderately thickened annulus. Mitral valve demonstrates mildly     thickened, mildly calcified leaflets. care of this patient. Please do not hesitate to call with questions or concerns.        Damaris Smith MD  [address] [facility] Nephrology

## 2019-08-04 NOTE — PROGRESS NOTES
SP02 % ON ROOM AIR AT REST 95%  SP02 % AMBULATION ON ROOM AIR 81%  SPO2% AMBULATION ON 02 94% ON  4 LITERS PER MINUTE

## 2019-08-04 NOTE — PROGRESS NOTES
[facility] hospitalist daily note  Patient was seen/examined on 8/4/19     S; per pt he is feeling better  No chest  Pain, no fever, no abd pain, no nausea, no bleeding, no cough at this time  Per pt he did not sleep well and asking for sleping pill for tonight.

## 2019-08-04 NOTE — PROGRESS NOTES
08/04/19 0159   Provider Notification   Reason for Communication Patient request  (change frequency prn norco)   Provider Name Other (comment)  (Phuong)   Method of Communication Page   Response Waiting for response   Notification Time 0159   D

## 2019-08-04 NOTE — PHYSICAL THERAPY NOTE
PHYSICAL THERAPY EVALUATION - INPATIENT     Room Number: 865/055-E  Evaluation Date: 8/4/2019  Type of Evaluation: Initial  Physician Order: PT Eval and Treat    Presenting Problem: Pnuemonia  Reason for Therapy: Mobility Dysfunction and Discharge Pl reviewed. No pertinent surgical history.     HOME SITUATION  Type of Home: House   Home Layout: One level  Stairs to Enter : 2     Stairs to [employer]: 0       Lives With: Spouse  Drives: Yes  Patient Owned Equipment: None       Prior Level of [place]: I pain)     Comment : SOB when walking after 150' with desat to 85% after walking 300'    Skilled Therapy Provided: In bed and stated that he is feeling fine.  Pt on 2L/nc at 100% at rest. Gt training done on Ra with noted SOB after walking 150' and was noted is stable and overall the evaluation complexity is considered low. These impairments and comorbidities manifest themselves as functional limitations in independent bed mobility, transfers, and gait.   The patient is below baseline and would benefit from sk

## 2019-08-04 NOTE — PROGRESS NOTES
Pulmonary Progress Note        NAME: Sally Shock - ROOM: 707/520-C - MRN: [id number] - Age: 59year old - : 1955        Last 24hrs: No events overnight, continues to cough up blood tinged phlegm    OBJECTIVE:   19  0042  6. 9   HGB 12.0*  --  11.6* 10.7*   MCV 89.9  --  90.2 91.7   .0*  --  126.0* 119.0*   BAND 51  --   --   --    INR 1.61* 1.31*  --   --        Recent Labs     08/01/19  2030 08/02/19  0453 08/03/19  0435   * 129* 129*   K 3.7 3.5 4.1   CL 92*

## 2019-08-05 ENCOUNTER — APPOINTMENT (OUTPATIENT)
Dept: GENERAL RADIOLOGY | Facility: HOSPITAL | Age: 64
DRG: 871 | End: 2019-08-05
Attending: INTERNAL MEDICINE
Payer: MEDICAID

## 2019-08-05 VITALS
SYSTOLIC BLOOD PRESSURE: 155 MMHG | BODY MASS INDEX: 34.8 KG/M2 | RESPIRATION RATE: 18 BRPM | TEMPERATURE: 98 F | OXYGEN SATURATION: 98 % | HEART RATE: 92 BPM | HEIGHT: 69 IN | DIASTOLIC BLOOD PRESSURE: 86 MMHG | WEIGHT: 235 LBS

## 2019-08-05 LAB
ALBUMIN SERPL-MCNC: 2.2 G/DL (ref 3.4–5)
ANION GAP SERPL CALC-SCNC: 7 MMOL/L (ref 0–18)
BUN BLD-MCNC: 53 MG/DL (ref 7–18)
BUN/CREAT SERPL: 35.3 (ref 10–20)
CALCIUM BLD-MCNC: 9.8 MG/DL (ref 8.5–10.1)
CHLORIDE SERPL-SCNC: 109 MMOL/L (ref 98–112)
CO2 SERPL-SCNC: 20 MMOL/L (ref 21–32)
CREAT BLD-MCNC: 1.5 MG/DL (ref 0.7–1.3)
DEPRECATED RDW RBC AUTO: 45.5 FL (ref 35.1–46.3)
ERYTHROCYTE [DISTWIDTH] IN BLOOD BY AUTOMATED COUNT: 13.3 % (ref 11–15)
GLUCOSE BLD-MCNC: 154 MG/DL (ref 70–99)
GLUCOSE BLD-MCNC: 169 MG/DL (ref 70–99)
GLUCOSE BLD-MCNC: 222 MG/DL (ref 70–99)
HAV IGM SER QL: 2.7 MG/DL (ref 1.6–2.6)
HCT VFR BLD AUTO: 33.8 % (ref 39–53)
HGB BLD-MCNC: 11.4 G/DL (ref 13–17.5)
MCH RBC QN AUTO: 31.5 PG (ref 26–34)
MCHC RBC AUTO-ENTMCNC: 33.7 G/DL (ref 31–37)
MCV RBC AUTO: 93.4 FL (ref 80–100)
OSMOLALITY SERPL CALC.SUM OF ELEC: 299 MOSM/KG (ref 275–295)
PHOSPHATE SERPL-MCNC: 2.6 MG/DL (ref 2.5–4.9)
PLATELET # BLD AUTO: 114 10(3)UL (ref 150–450)
POTASSIUM SERPL-SCNC: 4.3 MMOL/L (ref 3.5–5.1)
RBC # BLD AUTO: 3.62 X10(6)UL (ref 4.3–5.7)
SODIUM SERPL-SCNC: 136 MMOL/L (ref 136–145)
WBC # BLD AUTO: 15.6 X10(3) UL (ref 4–11)

## 2019-08-05 PROCEDURE — 71046 X-RAY EXAM CHEST 2 VIEWS: CPT | Performed by: INTERNAL MEDICINE

## 2019-08-05 RX ORDER — AZITHROMYCIN 500 MG/1
500 TABLET, FILM COATED ORAL DAILY
Qty: 10 TABLET | Refills: 0 | Status: SHIPPED | OUTPATIENT
Start: 2019-08-05 | End: 2019-08-15

## 2019-08-05 RX ORDER — PREDNISONE 10 MG/1
TABLET ORAL
Qty: 20 TABLET | Refills: 0 | Status: SHIPPED | OUTPATIENT
Start: 2019-08-05 | End: 2019-08-23 | Stop reason: ALTCHOICE

## 2019-08-05 RX ORDER — PREDNISONE 20 MG/1
40 TABLET ORAL
Status: DISCONTINUED | OUTPATIENT
Start: 2019-08-06 | End: 2019-08-05

## 2019-08-05 RX ORDER — AMLODIPINE BESYLATE 5 MG/1
5 TABLET ORAL DAILY
Qty: 30 TABLET | Refills: 0 | Status: SHIPPED | OUTPATIENT
Start: 2019-08-06 | End: 2019-08-05

## 2019-08-05 NOTE — PROGRESS NOTES
[facility]  Progress Note    Jacky Berkowitz Patient Status:  Inpatient    1955 MRN [id number]   [facility] 5NW-A Attending Ameya Stanley MD   [facility] Day # 3 PCP Maggy Evans Minus, DO       SUBJECTIVE:  No acute events overnight LABS ORDERED    ----- Message from Cassie Diggs MD sent at 5/23/2018 12:53 PM EDT -----  Contact: patient  cmp flp tsh cbc vit d  ----- Message -----  From: Tabitha Young MA  Sent: 5/23/2018  11:35 AM  To: Cassie Diggs MD        ----- Message -----  From: Walter Jessica  Sent: 5/23/2018  11:14 AM  To: Tabitha Young MA    Patient will need lab orders for May 30th       Facility-Administered Medications:  HYDROcodone-acetaminophen (NORCO) 5-325 MG per tab 1 tablet 1 tablet Oral Q4H PRN   amLODIPine Besylate (NORVASC) tab 5 mg 5 mg Oral Daily   carvedilol (COREG) tab 25 mg 25 mg Oral BID with meals   insulin detemir (LEVEM daily at home monitored by Dr. Jamey Reilly  - exacerbated due to high doses of IV steroids   - continue levemir 50 units daily  - change to novolog 1:3g carbs   - continue novolog 1:10mg/dL qid   - manage hypoglycemia prn per protocol      2.  HTN - continue med

## 2019-08-05 NOTE — PROGRESS NOTES
[address] [facility] Cardiology  Progress Note    Manjit Jose Patient Status:  Inpatient    1955 MRN [id number]   [facility] 5NW-A Attending Laurita Allen MD   Hosp Day # 3 PCP Maggy Treadwell DO     Outpatient cardiologist: Facility-Administered Medications:  HYDROcodone-acetaminophen (NORCO) 5-325 MG per tab 1 tablet 1 tablet Oral Q4H PRN   amLODIPine Besylate (NORVASC) tab 5 mg 5 mg Oral Daily   carvedilol (COREG) tab 25 mg 25 mg Oral BID with meals   insulin detemir (LEVEM Date    INR 1.31 (H) 08/02/2019    INR 1.61 (H) 08/01/2019          Telemetry: No malignant tachyarrhythmias or bradyarrhythmias      Thank you for allowing our practice to participate in the care of your patient.  Please do not hesitate to contact me if yo

## 2019-08-05 NOTE — PLAN OF CARE
NURSING DISCHARGE NOTE    Discharged home via wheelchair. Accompanied by family. Belongings taken by patient. Tele & IV's d/c'd.  D/C instructions, meds, follow up appt explained to pt and family with understanding verbalized.   Pt given order for BM

## 2019-08-05 NOTE — PAYOR COMM NOTE
--------------  ADMISSION REVIEW     Payor: Jay Jay Peterson #:  [id number]  Authorization Number: [id number]    Admit date: 8/2/19  Admit time: [address]       Admitting Physician: José Tom MD  Attending Physician:  Bailey Griggs (5' 9\")   Wt 98.4 kg   SpO2 100%   BMI 32.05 kg/m²          Physical Exam    Pale 59year-old brought into the emergency department on emergency department bed.   Paramedics are transferring the patient to the bed, he is obese and tachypnea he is not on hi Glucose 245 (*)     All other components within normal limits   CBC W/ DIFFERENTIAL - Abnormal; Notable for the following components:    RBC 3.86 (*)     HGB 12.0 (*)     HCT 34.7 (*)     .0 (*)     All other components within normal limits   CBC WI intravenous contrast.      PRAVIN:  No mass or adenopathy. MEDIASTINUM:  Mildly prominent paratracheal and AP window lymph nodes are     noted. A representative AP window lymph node measures 1.0 x 0.6 cm (image     54).   A representative paratracheal l consolidation.                    Dictated by: Blane Mansfield MD on 8/01/2019 at 21:39         Approved by: Blane Mansfield MD                     [facility]   Patient with A. fib with RVR now stabilized on Cardizem drip, hypoxia was managed with BiPAP quit  (Physician) filed at 8/2/2019  1:26 PM         [facility] Hospitalist History and Physical      Patient presents with:  Dyspnea MAYRA SOB (respiratory)       PCP: Maggy Stewart DO      History of Present Illness: Patient is a 59year old male with PMH sig non-tender. Bowel sounds normal. No masses,  No organomegaly. Non distended   Extremities: Extremities normal, atraumatic, no cyanosis or edema. Skin: Skin color, texture, turgor normal. No rashes or lesions.     Neurologic: Moving all extremities spontan representative paratracheal lymph node measures 1.3 x 0.8 cm (image 53). CARDIAC:  Coronary arterial calcifications are noted. Mitral valve calcifications are noted. PLEURA:  Tiny left pleural effusion is noted.  THORACIC AORTA:  The ascending aorta is mil on CKD  #Hyponatremia  -S/p IVF  -renal consulted  -ace/mat held    #Afib with RVR  #Trop elevation/NSTEMI  -suspect trop elevated 2/2 demand  -cardiology consulted  -echo pending  -on xarelto and dilt gtt -- changing to oral BB    #Anemia/Thrombocytopen 100 UNIT/ML flexpen 1-50 Units     Date Action Dose Route User    8/4/2019 1755 Given 17 Units Subcutaneous (Left Upper Arm) Peggy Logan RN    8/4/2019 1222 Given 12 Units Subcutaneous (Left Upper Arm) Peggy Logan RN    8/4/2019 1023 Gi 08/03/19 0100 — 81 15 84/49Abnormal  98 % — — —    08/03/19 0000 98.4 °F (36.9 °C) 91 18 106/70 100 % — — —    08/02/19 2300 — 99 22 118/69 95 % — Nasal cannula 4 L/min      08/02/19 0314 103.7 °F (39.8 °C)Abnormal  93 31Abnormal  116/62 94 % — — —

## 2019-08-05 NOTE — PAYOR COMM NOTE
--------------  CONTINUED STAY REVIEW    Payor: Jay Jay Peterson #:  [id number]  Authorization Number: [id number]    Admit date: 8/2/19  Admit time: [address]    Admitting Physician: Sloan Miranda MD  Attending Physician:  DAVID IN LAST 1 DAY:  amLODIPine Besylate (NORVASC) tab 5 mg     Date Action Dose Route User    8/5/2019 0803 Given 5 mg Oral Conner Oh RN      atorvastatin (LIPITOR) tab 40 mg     Date Action Dose Route User    8/4/2019 2054 Given 40 mg Oral Atif Koenig Pippa Carroll RN      lisinopril (PRINIVIL,ZESTRIL) tab 20 mg     Date Action Dose Route User    8/5/2019 1350 Given 20 mg Oral Pippa Carroll RN      methylPREDNISolone Sodium Succ (Solu-MEDROL) injection 60 mg     Date Action Dose Route User    8/5/2019

## 2019-08-05 NOTE — PROGRESS NOTES
Pulmonary Progress Note        NAME: Cynthia Blocker - ROOM: 696/350-D - MRN: [id number] - Age: 59year old - : 1955        Last 24hrs: No events overnight, feels better today, not coughing as much, blood is less    OBJECTIVE:   19 89.9  --  90.2 91.7   .0*  --  126.0* 119.0*   BAND 51  --   --   --    INR 1.61* 1.31*  --   --        Recent Labs     08/01/19  2030 08/02/19  0453 08/03/19  0435   * 129* 129*   K 3.7 3.5 4.1   CL 92* 95* 96*   CO2 26.0 23.0 24.0   BUN 52*

## 2019-08-05 NOTE — PROGRESS NOTES
[facility] hospitalist daily note  Patient was seen/examined on 8/5/19     S; per pt he is feeling better  No chest  Pain, no fever, no abd pain, no nausea, no bleeding, no cough at this time  Wants to go home        Medications in Epic     PE    08/05/19  6489

## 2019-08-05 NOTE — PAYOR COMM NOTE
--------------  CONTINUED STAY REVIEW    Payor: Jay Jay Peterson #:  [id number]  Authorization Number: [id number]    Admit date: 8/2/19  Admit time: [address]    Admitting Physician: Sloan Miranda MD  Attending Physician:  DAVID rales in left base, few exp wheezes on right  Heart: S1, S2 normal, no murmur, click, rub or gallop, regular rate and rhythm  Abdomen: soft, non-tender; bowel sounds normal; no masses,  no organomegaly  Extremities: extremities normal, atraumatic, no cyano steroids given the extent of the disease  -repeat x-ray tomorrow to assess for progression vs improvement  2. Hypoxia  -due to above  -wean O2 as tolerated  3. Tob abuse  -encouraged to remain smoke free  -may need NRT  4. FEN  -diet as tolerated  5.  Proph (Left Upper Arm) Radha Thapa RN    8/4/2019 1222 Given 12 Units Subcutaneous (Left Upper Arm) Radha Thapa RN    8/4/2019 1023 Given 17 Units Subcutaneous (Left Lower Abdomen) Nerissa Garcia RN      Insulin Aspart Pen (NOVOLOG) 10

## 2019-08-05 NOTE — PROGRESS NOTES
[facility]    Nephrology Progress Note    Alyssia Quiñonez Attending:  Justyna Chaidez MD     Cc: PILI    SUBJECTIVE     Doing ok.    No sob or n/v/d    PHYSICAL EXAM   Vital signs: /86 (BP Location: Right arm)   Pulse 92   Temp 98 °F (36.7 °C) (Or Aspart Pen (NOVOLOG) 100 UNIT/ML flexpen 1-60 Units 1-60 Units Subcutaneous Once   ondansetron HCl (ZOFRAN) injection 4 mg 4 mg Intravenous Q6H PRN   Metoclopramide HCl (REGLAN) injection 5 mg 5 mg Intravenous Q8H PRN   glucose (DEX4) oral liquid 15 g 15 g scarring can not be ruled out. Moderately calcified, moderately     fibrotic, moderately thickened annulus. Mitral valve demonstrates mildly     thickened, mildly calcified leaflets. There was mild regurgitation. Mean     gradient (D): 3mm Hg.   4. Left atr RN    Thank you for allowing me to participate in the care of this patient. Please do not hesitate to call with questions or concerns.        Nayely Delgado MD  [address] [facility] Nephrology

## 2019-08-06 NOTE — DISCHARGE SUMMARY
[facility]  Discharge Summary    Aura Ruvalcaba Patient Status:  Inpatient    1955 MRN [id number]   [facility] 5NW-A Attending No att. providers found   Hosp Day # 3 PCP Maggy Ignacio DO     Date of Admission: 2019  steroid started per pulmonology  Pt getting better on Antibiotic.  Leukocytosis better     #PILI on CKD  #Hyponatremia  -renal consulted        #Afib with RVR  #Trop elevation/NSTEMI  -suspect trop elevated 2/2 demand  -cardiology consulted, suspect deman daily for 10 days. , Normal, Disp-10 tablet, R-0    Insulin Lispro (ADMELOG SOLOSTAR) 100 UNIT/ML Subcutaneous Solution Pen-injector  Take 1 unit per 10mg/dL >140mg/dL for corrections, Normal, Disp-15 mL, R-3      CONTINUE these medications which have NOT C Disp-200 each, R-3Please [facility].    !! Glucose Blood (DINAH CONTOUR TEST) In Vitro Strip  dinah counter next test strip- Checking daily, Normal, Disp-100 strip, R-3Dispense dinah contour next strip    Testo Other Discharge Instructions:   Please check blood work (BMP) on Wednesday and check with Dr. Mat Hernández if ok to resume lasix.     If BP less than 120/50, hold spironalactone  If BP less than 100/50, call MD  If BP greater than 180/100, seek medical at

## 2019-08-06 NOTE — PAYOR COMM NOTE
--------------  DISCHARGE REVIEW    Payor: Jay Jay Peterson #:  [id number]  Authorization Number: [id number]    Admit date: 8/2/19  Admit time:  1800  Discharge Date: 8/5/2019  4:02 PM     Admitting Physician: Cali Sinha 98 °F (36.7 °C)         Gen: awake, alert, oriented x slef, place, month, year, president, no acute  distress  HEENT; mmm, anicteric  CV:  nl S1/S2  Pulm:decreased breath sounds   Abd; soft, not tender, +BS  Ext: no calf tenderness b/l     Labs  Creatinine contacts. No dizziness or palpitations.  + cough + smoker         Consultations: Pulmonology, Cardiology, Nephrology, Endocrine      Procedures: please see Epic and hospital course    Complications: please see Epic and hospital course    Disposition: Home o (eight) hours as needed for Pain., Print Script, Disp-90 tablet, R-0    Rivaroxaban 20 MG Oral Tab  Take 20 mg by mouth., Historical    atorvastatin 40 MG Oral Tab  Historical    carvedilol 25 MG Oral Tab  Take 25 mg by mouth 2 (two) times daily with meals Internal Medicine  Contact information:  Kishor Aj MD.    Specialty:  ENDOCRINOLOGY  Contact information:  [address]  [address] [place] [address]  [phone] breathing, headache or visual disturbances  6. hives  7. persistent dizziness or light-headedness  8. extreme fatigue  9. Numbness/tingling  10.  Difficulty urinating or defecating  11. bleeding     Do not drive when taking pain medications  Do not exceed 4

## 2019-11-05 ENCOUNTER — HOSPITAL ENCOUNTER (EMERGENCY)
Facility: HOSPITAL | Age: 64
Discharge: HOME | End: 2019-11-05
Attending: EMERGENCY MEDICINE
Payer: MEDICAID

## 2019-11-05 VITALS
RESPIRATION RATE: 19 BRPM | TEMPERATURE: 98 F | HEART RATE: 89 BPM | DIASTOLIC BLOOD PRESSURE: 105 MMHG | OXYGEN SATURATION: 96 % | HEIGHT: 68 IN | WEIGHT: 211 LBS | SYSTOLIC BLOOD PRESSURE: 176 MMHG | BODY MASS INDEX: 31.98 KG/M2

## 2019-11-05 DIAGNOSIS — R89.9 ABNORMAL LABORATORY TEST: Primary | ICD-10-CM

## 2019-11-05 PROCEDURE — 99284 EMERGENCY DEPT VISIT MOD MDM: CPT

## 2019-11-05 PROCEDURE — 36415 COLL VENOUS BLD VENIPUNCTURE: CPT

## 2019-11-05 PROCEDURE — 93010 ELECTROCARDIOGRAM REPORT: CPT

## 2019-11-05 PROCEDURE — 93005 ELECTROCARDIOGRAM TRACING: CPT

## 2019-11-05 PROCEDURE — 99285 EMERGENCY DEPT VISIT HI MDM: CPT

## 2019-11-05 PROCEDURE — 80048 BASIC METABOLIC PNL TOTAL CA: CPT | Performed by: EMERGENCY MEDICINE

## 2019-11-06 NOTE — ED PROVIDER NOTES
Patient Seen in: [facility] Emergency Department      History   Patient presents with:  Abnormal Result (metabolic, cardiac)    Stated Complaint: elevated potassium    HPI    62year-old with a history of atrial fibrillation, hypertension, chronic k kg/m²         Physical Exam    General: Patient is awake, alert in no acute distress. HEENT:   Sclera are not icteric. Conjunctivae within normal limits. Mucous members are moist.   Cardiovascular: Irregularly irregular rhythm, normal S1-S2.   Respirato

## 2019-11-06 NOTE — ED INITIAL ASSESSMENT (HPI)
Pt sent to ED by Dr. Kyle Oconnell for abnormal labs drawn today. Pt has hx of renal disorder, potassium elevated at 6 per pt.

## 2020-07-14 PROBLEM — G89.29 OTHER CHRONIC PAIN: Status: ACTIVE | Noted: 2020-07-14

## 2020-11-10 PROBLEM — S13.4XXA WHIPLASH INJURY TO NECK, INITIAL ENCOUNTER: Status: ACTIVE | Noted: 2020-11-10

## 2020-11-10 PROBLEM — S43.421A SPRAIN OF RIGHT ROTATOR CUFF CAPSULE, INITIAL ENCOUNTER: Status: ACTIVE | Noted: 2020-11-10

## 2020-11-10 PROBLEM — M79.2 RADICULAR PAIN IN RIGHT ARM: Status: ACTIVE | Noted: 2020-11-10

## 2020-11-10 PROBLEM — S13.9XXS NECK SPRAIN, SEQUELA: Status: ACTIVE | Noted: 2020-11-10

## 2020-12-14 PROBLEM — R09.02 HYPOXIA: Status: RESOLVED | Noted: 2019-08-02 | Resolved: 2020-12-14

## 2020-12-14 PROBLEM — R77.8 ELEVATED TROPONIN: Status: RESOLVED | Noted: 2019-08-02 | Resolved: 2020-12-14

## 2020-12-14 PROBLEM — J18.1 CONSOLIDATION OF LEFT LOWER LOBE OF LUNG (HCC): Status: RESOLVED | Noted: 2019-08-01 | Resolved: 2020-12-14

## 2020-12-14 PROBLEM — R79.89 AZOTEMIA: Status: RESOLVED | Noted: 2019-08-01 | Resolved: 2020-12-14

## 2020-12-14 PROBLEM — M54.2 POSTERIOR NECK PAIN: Status: RESOLVED | Noted: 2018-05-04 | Resolved: 2020-12-14

## 2020-12-14 PROBLEM — S43.421A SPRAIN OF RIGHT ROTATOR CUFF CAPSULE, INITIAL ENCOUNTER: Status: RESOLVED | Noted: 2020-11-10 | Resolved: 2020-12-14

## 2020-12-14 PROBLEM — Z86.79 H/O: HYPERTENSION: Status: RESOLVED | Noted: 2017-03-10 | Resolved: 2020-12-14

## 2020-12-14 PROBLEM — M79.2 RADICULAR PAIN IN RIGHT ARM: Status: RESOLVED | Noted: 2020-11-10 | Resolved: 2020-12-14

## 2020-12-14 PROBLEM — N28.9 ACUTE RENAL INSUFFICIENCY: Status: RESOLVED | Noted: 2019-08-02 | Resolved: 2020-12-14

## 2020-12-14 PROBLEM — M54.50 CHRONIC BILATERAL LOW BACK PAIN WITHOUT SCIATICA: Status: RESOLVED | Noted: 2018-05-04 | Resolved: 2020-12-14

## 2020-12-14 PROBLEM — R73.9 HYPERGLYCEMIA: Status: RESOLVED | Noted: 2019-08-01 | Resolved: 2020-12-14

## 2020-12-14 PROBLEM — E87.3 METABOLIC ALKALOSIS: Status: RESOLVED | Noted: 2019-08-01 | Resolved: 2020-12-14

## 2020-12-14 PROBLEM — E87.1 HYPONATREMIA: Status: RESOLVED | Noted: 2019-08-01 | Resolved: 2020-12-14

## 2020-12-14 PROBLEM — E87.3 RESPIRATORY ALKALOSIS: Status: RESOLVED | Noted: 2019-08-01 | Resolved: 2020-12-14

## 2020-12-14 PROBLEM — R79.89 LOW TESTOSTERONE: Status: ACTIVE | Noted: 2020-12-14

## 2020-12-14 PROBLEM — G89.29 CHRONIC BILATERAL LOW BACK PAIN WITHOUT SCIATICA: Status: RESOLVED | Noted: 2018-05-04 | Resolved: 2020-12-14

## 2020-12-14 PROBLEM — N17.9 ACUTE KIDNEY INJURY (HCC): Status: RESOLVED | Noted: 2019-08-01 | Resolved: 2020-12-14

## 2020-12-14 PROBLEM — S13.9XXS NECK SPRAIN, SEQUELA: Status: RESOLVED | Noted: 2020-11-10 | Resolved: 2020-12-14

## 2020-12-14 PROBLEM — I10 ESSENTIAL HYPERTENSION: Status: RESOLVED | Noted: 2018-09-05 | Resolved: 2020-12-14

## 2020-12-14 PROBLEM — I48.91 ATRIAL FIBRILLATION WITH RAPID VENTRICULAR RESPONSE (HCC): Status: RESOLVED | Noted: 2019-08-02 | Resolved: 2020-12-14

## 2020-12-14 PROBLEM — S13.4XXA WHIPLASH INJURY TO NECK, INITIAL ENCOUNTER: Status: RESOLVED | Noted: 2020-11-10 | Resolved: 2020-12-14

## 2021-12-13 PROBLEM — S46.811D PARTIAL TEAR OF RIGHT SUBSCAPULARIS TENDON, SUBSEQUENT ENCOUNTER: Status: ACTIVE | Noted: 2021-01-01

## 2021-12-13 PROBLEM — M54.16 LEFT LUMBAR RADICULOPATHY: Status: ACTIVE | Noted: 2021-12-13

## 2021-12-13 PROBLEM — S43.003A SUBLUXATION OF TENDON OF LONG HEAD OF BICEPS: Status: ACTIVE | Noted: 2021-01-01

## 2021-12-13 PROBLEM — M54.16 LEFT LUMBAR RADICULOPATHY: Status: ACTIVE | Noted: 2021-01-01

## 2021-12-13 PROBLEM — S46.811D PARTIAL TEAR OF RIGHT SUBSCAPULARIS TENDON, SUBSEQUENT ENCOUNTER: Status: ACTIVE | Noted: 2021-12-13

## 2021-12-13 PROBLEM — S46.119A SUBLUXATION OF TENDON OF LONG HEAD OF BICEPS: Status: ACTIVE | Noted: 2021-12-13

## 2022-01-01 ENCOUNTER — APPOINTMENT (OUTPATIENT)
Dept: GENERAL RADIOLOGY | Facility: HOSPITAL | Age: 67
End: 2022-01-01
Attending: HOSPITALIST
Payer: MEDICAID

## 2022-01-01 ENCOUNTER — APPOINTMENT (OUTPATIENT)
Dept: CV DIAGNOSTICS | Facility: HOSPITAL | Age: 67
End: 2022-01-01
Attending: INTERNAL MEDICINE
Payer: MEDICAID

## 2022-01-01 ENCOUNTER — APPOINTMENT (OUTPATIENT)
Dept: GENERAL RADIOLOGY | Facility: HOSPITAL | Age: 67
End: 2022-01-01
Attending: INTERNAL MEDICINE
Payer: MEDICAID

## 2022-01-01 ENCOUNTER — APPOINTMENT (OUTPATIENT)
Dept: CV DIAGNOSTICS | Facility: HOSPITAL | Age: 67
DRG: 208 | End: 2022-01-01
Attending: INTERNAL MEDICINE
Payer: MEDICAID

## 2022-01-01 ENCOUNTER — APPOINTMENT (OUTPATIENT)
Dept: GENERAL RADIOLOGY | Facility: HOSPITAL | Age: 67
DRG: 208 | End: 2022-01-01
Attending: INTERNAL MEDICINE
Payer: MEDICAID

## 2022-01-01 ENCOUNTER — APPOINTMENT (OUTPATIENT)
Dept: GENERAL RADIOLOGY | Facility: HOSPITAL | Age: 67
End: 2022-01-01
Attending: PHYSICIAN ASSISTANT
Payer: MEDICAID

## 2022-01-01 ENCOUNTER — APPOINTMENT (OUTPATIENT)
Dept: CT IMAGING | Facility: HOSPITAL | Age: 67
DRG: 208 | End: 2022-01-01
Attending: NURSE PRACTITIONER
Payer: MEDICAID

## 2022-01-01 ENCOUNTER — APPOINTMENT (OUTPATIENT)
Dept: GENERAL RADIOLOGY | Facility: HOSPITAL | Age: 67
DRG: 208 | End: 2022-01-01
Attending: HOSPITALIST
Payer: MEDICAID

## 2022-01-01 ENCOUNTER — APPOINTMENT (OUTPATIENT)
Dept: GENERAL RADIOLOGY | Facility: HOSPITAL | Age: 67
DRG: 208 | End: 2022-01-01
Attending: NURSE PRACTITIONER
Payer: MEDICAID

## 2022-01-01 ENCOUNTER — APPOINTMENT (OUTPATIENT)
Dept: CT IMAGING | Facility: HOSPITAL | Age: 67
End: 2022-01-01
Attending: NURSE PRACTITIONER
Payer: MEDICAID

## 2022-01-01 ENCOUNTER — APPOINTMENT (OUTPATIENT)
Dept: CT IMAGING | Facility: HOSPITAL | Age: 67
DRG: 208 | End: 2022-01-01
Attending: INTERNAL MEDICINE
Payer: MEDICAID

## 2022-01-01 ENCOUNTER — APPOINTMENT (OUTPATIENT)
Dept: GENERAL RADIOLOGY | Facility: HOSPITAL | Age: 67
End: 2022-01-01
Attending: NURSE PRACTITIONER
Payer: MEDICAID

## 2022-01-01 ENCOUNTER — HOSPITAL ENCOUNTER (INPATIENT)
Facility: HOSPITAL | Age: 67
LOS: 17 days | End: 2022-01-01
Attending: INTERNAL MEDICINE | Admitting: INTERNAL MEDICINE
Payer: MEDICAID

## 2022-01-01 ENCOUNTER — HOSPITAL ENCOUNTER (INPATIENT)
Facility: HOSPITAL | Age: 67
LOS: 17 days | DRG: 208 | End: 2022-01-01
Attending: INTERNAL MEDICINE | Admitting: INTERNAL MEDICINE
Payer: MEDICAID

## 2022-01-01 ENCOUNTER — APPOINTMENT (OUTPATIENT)
Dept: MRI IMAGING | Facility: HOSPITAL | Age: 67
DRG: 208 | End: 2022-01-01
Attending: HOSPITALIST
Payer: MEDICAID

## 2022-01-01 ENCOUNTER — APPOINTMENT (OUTPATIENT)
Dept: CT IMAGING | Facility: HOSPITAL | Age: 67
End: 2022-01-01
Attending: INTERNAL MEDICINE
Payer: MEDICAID

## 2022-01-01 ENCOUNTER — APPOINTMENT (OUTPATIENT)
Dept: GENERAL RADIOLOGY | Facility: HOSPITAL | Age: 67
DRG: 208 | End: 2022-01-01
Attending: PHYSICIAN ASSISTANT
Payer: MEDICAID

## 2022-01-01 ENCOUNTER — APPOINTMENT (OUTPATIENT)
Dept: MRI IMAGING | Facility: HOSPITAL | Age: 67
End: 2022-01-01
Attending: HOSPITALIST
Payer: MEDICAID

## 2022-01-01 VITALS
RESPIRATION RATE: 31 BRPM | OXYGEN SATURATION: 76 % | DIASTOLIC BLOOD PRESSURE: 103 MMHG | WEIGHT: 235.25 LBS | BODY MASS INDEX: 36 KG/M2 | TEMPERATURE: 97 F | SYSTOLIC BLOOD PRESSURE: 117 MMHG

## 2022-01-01 LAB
ADENOVIRUS PCR:: NOT DETECTED
ADENOVIRUS PCR:: NOT DETECTED
ALBUMIN SERPL ELPH-MCNC: 2.7 G/DL (ref 3.75–5.21)
ALBUMIN SERPL-MCNC: 1.5 G/DL (ref 3.4–5)
ALBUMIN SERPL-MCNC: 1.5 G/DL (ref 3.4–5)
ALBUMIN SERPL-MCNC: 1.6 G/DL (ref 3.4–5)
ALBUMIN SERPL-MCNC: 1.7 G/DL (ref 3.4–5)
ALBUMIN SERPL-MCNC: 2 G/DL (ref 3.4–5)
ALBUMIN SERPL-MCNC: 2 G/DL (ref 3.4–5)
ALBUMIN SERPL-MCNC: 2.1 G/DL (ref 3.4–5)
ALBUMIN SERPL-MCNC: 2.2 G/DL (ref 3.4–5)
ALBUMIN SERPL-MCNC: 2.3 G/DL (ref 3.4–5)
ALBUMIN SERPL-MCNC: 2.4 G/DL (ref 3.4–5)
ALBUMIN SERPL-MCNC: 2.4 G/DL (ref 3.4–5)
ALBUMIN/GLOB SERPL: 0.4 {RATIO} (ref 1–2)
ALBUMIN/GLOB SERPL: 0.5 {RATIO} (ref 1–2)
ALBUMIN/GLOB SERPL: 0.6 {RATIO} (ref 1–2)
ALBUMIN/GLOB SERPL: 0.82 {RATIO} (ref 1–2)
ALP LIVER SERPL-CCNC: 105 U/L
ALP LIVER SERPL-CCNC: 108 U/L
ALP LIVER SERPL-CCNC: 121 U/L
ALP LIVER SERPL-CCNC: 159 U/L
ALP LIVER SERPL-CCNC: 166 U/L
ALP LIVER SERPL-CCNC: 173 U/L
ALP LIVER SERPL-CCNC: 178 U/L
ALP LIVER SERPL-CCNC: 201 U/L
ALP LIVER SERPL-CCNC: 98 U/L
ALP LIVER SERPL-CCNC: 99 U/L
ALPHA1 GLOB SERPL ELPH-MCNC: 0.51 G/DL (ref 0.19–0.46)
ALPHA2 GLOB SERPL ELPH-MCNC: 0.95 G/DL (ref 0.48–1.05)
ALT SERPL-CCNC: 31 U/L
ALT SERPL-CCNC: 32 U/L
ALT SERPL-CCNC: 32 U/L
ALT SERPL-CCNC: 34 U/L
ALT SERPL-CCNC: 35 U/L
ALT SERPL-CCNC: 36 U/L
ALT SERPL-CCNC: 37 U/L
ALT SERPL-CCNC: 43 U/L
ALT SERPL-CCNC: 45 U/L
ALT SERPL-CCNC: 65 U/L
AMMONIA PLAS-MCNC: 32 UMOL/L (ref 11–32)
AMMONIA PLAS-MCNC: 32 UMOL/L (ref 11–32)
ANION GAP SERPL CALC-SCNC: 0 MMOL/L (ref 0–18)
ANION GAP SERPL CALC-SCNC: 0 MMOL/L (ref 0–18)
ANION GAP SERPL CALC-SCNC: 1 MMOL/L (ref 0–18)
ANION GAP SERPL CALC-SCNC: 1 MMOL/L (ref 0–18)
ANION GAP SERPL CALC-SCNC: 10 MMOL/L (ref 0–18)
ANION GAP SERPL CALC-SCNC: 10 MMOL/L (ref 0–18)
ANION GAP SERPL CALC-SCNC: 3 MMOL/L (ref 0–18)
ANION GAP SERPL CALC-SCNC: 4 MMOL/L (ref 0–18)
ANION GAP SERPL CALC-SCNC: 5 MMOL/L (ref 0–18)
ANION GAP SERPL CALC-SCNC: 5 MMOL/L (ref 0–18)
ANION GAP SERPL CALC-SCNC: 6 MMOL/L (ref 0–18)
ANION GAP SERPL CALC-SCNC: 7 MMOL/L (ref 0–18)
ANION GAP SERPL CALC-SCNC: 8 MMOL/L (ref 0–18)
APTT PPP: 23 SECONDS (ref 23.3–35.6)
APTT PPP: 28.1 SECONDS (ref 23.3–35.6)
APTT PPP: 36.4 SECONDS (ref 23.3–35.6)
APTT PPP: 50.9 SECONDS (ref 23.3–35.6)
APTT PPP: 51.5 SECONDS (ref 23.3–35.6)
APTT PPP: 52.5 SECONDS (ref 23.3–35.6)
APTT PPP: 53.5 SECONDS (ref 23.3–35.6)
APTT PPP: 55.1 SECONDS (ref 23.3–35.6)
APTT PPP: 55.2 SECONDS (ref 23.3–35.6)
APTT PPP: 55.5 SECONDS (ref 23.3–35.6)
APTT PPP: 55.6 SECONDS (ref 23.3–35.6)
APTT PPP: 58 SECONDS (ref 23.3–35.6)
APTT PPP: 59.3 SECONDS (ref 23.3–35.6)
APTT PPP: 68.8 SECONDS (ref 23.3–35.6)
APTT PPP: 74 SECONDS (ref 23.3–35.6)
ARTERIAL PATENCY WRIST A: POSITIVE
AST SERPL-CCNC: 22 U/L (ref 15–37)
AST SERPL-CCNC: 25 U/L (ref 15–37)
AST SERPL-CCNC: 25 U/L (ref 15–37)
AST SERPL-CCNC: 27 U/L (ref 15–37)
AST SERPL-CCNC: 27 U/L (ref 15–37)
AST SERPL-CCNC: 28 U/L (ref 15–37)
AST SERPL-CCNC: 29 U/L (ref 15–37)
AST SERPL-CCNC: 30 U/L (ref 15–37)
AST SERPL-CCNC: 34 U/L (ref 15–37)
AST SERPL-CCNC: 89 U/L (ref 15–37)
ATRIAL RATE: 27 BPM
ATRIAL RATE: 86 BPM
B PARAPERT DNA SPEC QL NAA+PROBE: NOT DETECTED
B PARAPERT DNA SPEC QL NAA+PROBE: NOT DETECTED
B PERT DNA SPEC QL NAA+PROBE: NOT DETECTED
B PERT DNA SPEC QL NAA+PROBE: NOT DETECTED
B-GLOBULIN SERPL ELPH-MCNC: 0.8 G/DL (ref 0.68–1.23)
BASE EXCESS BLDA CALC-SCNC: -0.1 MMOL/L (ref ?–2)
BASE EXCESS BLDA CALC-SCNC: -1.3 MMOL/L (ref ?–2)
BASE EXCESS BLDA CALC-SCNC: -2.1 MMOL/L (ref ?–2)
BASE EXCESS BLDA CALC-SCNC: -4.2 MMOL/L (ref ?–2)
BASE EXCESS BLDA CALC-SCNC: -4.8 MMOL/L (ref ?–2)
BASE EXCESS BLDA CALC-SCNC: -5.1 MMOL/L (ref ?–2)
BASE EXCESS BLDA CALC-SCNC: 2.7 MMOL/L (ref ?–2)
BASE EXCESS BLDA CALC-SCNC: 4.7 MMOL/L (ref ?–2)
BASOPHILS # BLD AUTO: 0.01 X10(3) UL (ref 0–0.2)
BASOPHILS # BLD AUTO: 0.02 X10(3) UL (ref 0–0.2)
BASOPHILS # BLD AUTO: 0.03 X10(3) UL (ref 0–0.2)
BASOPHILS # BLD AUTO: 0.04 X10(3) UL (ref 0–0.2)
BASOPHILS # BLD AUTO: 0.04 X10(3) UL (ref 0–0.2)
BASOPHILS # BLD AUTO: 0.05 X10(3) UL (ref 0–0.2)
BASOPHILS # BLD: 0 X10(3) UL (ref 0–0.2)
BASOPHILS NFR BLD AUTO: 0.1 %
BASOPHILS NFR BLD AUTO: 0.1 %
BASOPHILS NFR BLD AUTO: 0.2 %
BASOPHILS NFR BLD AUTO: 0.3 %
BASOPHILS NFR BLD AUTO: 0.4 %
BASOPHILS NFR BLD: 0 %
BILIRUB SERPL-MCNC: 0.3 MG/DL (ref 0.1–2)
BILIRUB SERPL-MCNC: 0.3 MG/DL (ref 0.1–2)
BILIRUB SERPL-MCNC: 0.4 MG/DL (ref 0.1–2)
BILIRUB SERPL-MCNC: 0.4 MG/DL (ref 0.1–2)
BILIRUB SERPL-MCNC: 0.5 MG/DL (ref 0.1–2)
BILIRUB SERPL-MCNC: 0.6 MG/DL (ref 0.1–2)
BILIRUB SERPL-MCNC: 0.8 MG/DL (ref 0.1–2)
BILIRUB UR QL STRIP.AUTO: NEGATIVE
BODY TEMPERATURE: 97.4 F
BODY TEMPERATURE: 98.3 F
BODY TEMPERATURE: 98.6 F
BUN BLD-MCNC: 105 MG/DL (ref 7–18)
BUN BLD-MCNC: 110 MG/DL (ref 7–18)
BUN BLD-MCNC: 129 MG/DL (ref 7–18)
BUN BLD-MCNC: 132 MG/DL (ref 7–18)
BUN BLD-MCNC: 136 MG/DL (ref 7–18)
BUN BLD-MCNC: 47 MG/DL (ref 7–18)
BUN BLD-MCNC: 48 MG/DL (ref 7–18)
BUN BLD-MCNC: 48 MG/DL (ref 7–18)
BUN BLD-MCNC: 49 MG/DL (ref 7–18)
BUN BLD-MCNC: 51 MG/DL (ref 7–18)
BUN BLD-MCNC: 52 MG/DL (ref 7–18)
BUN BLD-MCNC: 52 MG/DL (ref 7–18)
BUN BLD-MCNC: 53 MG/DL (ref 7–18)
BUN BLD-MCNC: 53 MG/DL (ref 7–18)
BUN BLD-MCNC: 54 MG/DL (ref 7–18)
BUN BLD-MCNC: 55 MG/DL (ref 7–18)
BUN BLD-MCNC: 67 MG/DL (ref 7–18)
BUN BLD-MCNC: 67 MG/DL (ref 7–18)
BUN BLD-MCNC: 70 MG/DL (ref 7–18)
BUN BLD-MCNC: 71 MG/DL (ref 7–18)
BUN BLD-MCNC: 71 MG/DL (ref 7–18)
BUN BLD-MCNC: 82 MG/DL (ref 7–18)
BUN BLD-MCNC: 91 MG/DL (ref 7–18)
C PNEUM DNA SPEC QL NAA+PROBE: NOT DETECTED
C PNEUM DNA SPEC QL NAA+PROBE: NOT DETECTED
CA-I BLD-SCNC: 1.21 MMOL/L (ref 0.95–1.32)
CA-I BLD-SCNC: 1.22 MMOL/L (ref 0.95–1.32)
CA-I BLD-SCNC: 1.23 MMOL/L (ref 0.95–1.32)
CA-I BLD-SCNC: 1.26 MMOL/L (ref 0.95–1.32)
CA-I BLD-SCNC: 1.29 MMOL/L (ref 0.95–1.32)
CA-I BLD-SCNC: 1.53 MMOL/L (ref 0.95–1.32)
CALCIUM BLD-MCNC: 8 MG/DL (ref 8.5–10.1)
CALCIUM BLD-MCNC: 8.1 MG/DL (ref 8.5–10.1)
CALCIUM BLD-MCNC: 8.1 MG/DL (ref 8.5–10.1)
CALCIUM BLD-MCNC: 8.3 MG/DL (ref 8.5–10.1)
CALCIUM BLD-MCNC: 8.4 MG/DL (ref 8.5–10.1)
CALCIUM BLD-MCNC: 8.5 MG/DL (ref 8.5–10.1)
CALCIUM BLD-MCNC: 8.6 MG/DL (ref 8.5–10.1)
CALCIUM BLD-MCNC: 8.7 MG/DL (ref 8.5–10.1)
CALCIUM BLD-MCNC: 8.8 MG/DL (ref 8.5–10.1)
CALCIUM BLD-MCNC: 8.9 MG/DL (ref 8.5–10.1)
CALCIUM BLD-MCNC: 9 MG/DL (ref 8.5–10.1)
CALCIUM BLD-MCNC: 9 MG/DL (ref 8.5–10.1)
CALCIUM BLD-MCNC: 9.4 MG/DL (ref 8.5–10.1)
CALCIUM BLD-MCNC: 9.6 MG/DL (ref 8.5–10.1)
CALCIUM BLD-MCNC: 9.7 MG/DL (ref 8.5–10.1)
CHLORIDE SERPL-SCNC: 101 MMOL/L (ref 98–112)
CHLORIDE SERPL-SCNC: 103 MMOL/L (ref 98–112)
CHLORIDE SERPL-SCNC: 103 MMOL/L (ref 98–112)
CHLORIDE SERPL-SCNC: 105 MMOL/L (ref 98–112)
CHLORIDE SERPL-SCNC: 106 MMOL/L (ref 98–112)
CHLORIDE SERPL-SCNC: 106 MMOL/L (ref 98–112)
CHLORIDE SERPL-SCNC: 110 MMOL/L (ref 98–112)
CHLORIDE SERPL-SCNC: 112 MMOL/L (ref 98–112)
CHLORIDE SERPL-SCNC: 115 MMOL/L (ref 98–112)
CHLORIDE SERPL-SCNC: 115 MMOL/L (ref 98–112)
CHLORIDE SERPL-SCNC: 116 MMOL/L (ref 98–112)
CHLORIDE SERPL-SCNC: 118 MMOL/L (ref 98–112)
CHLORIDE SERPL-SCNC: 120 MMOL/L (ref 98–112)
CHLORIDE SERPL-SCNC: 121 MMOL/L (ref 98–112)
CHLORIDE SERPL-SCNC: 122 MMOL/L (ref 98–112)
CHOLEST SERPL-MCNC: 103 MG/DL (ref ?–200)
CO2 SERPL-SCNC: 20 MMOL/L (ref 21–32)
CO2 SERPL-SCNC: 21 MMOL/L (ref 21–32)
CO2 SERPL-SCNC: 22 MMOL/L (ref 21–32)
CO2 SERPL-SCNC: 22 MMOL/L (ref 21–32)
CO2 SERPL-SCNC: 23 MMOL/L (ref 21–32)
CO2 SERPL-SCNC: 24 MMOL/L (ref 21–32)
CO2 SERPL-SCNC: 25 MMOL/L (ref 21–32)
CO2 SERPL-SCNC: 25 MMOL/L (ref 21–32)
CO2 SERPL-SCNC: 26 MMOL/L (ref 21–32)
CO2 SERPL-SCNC: 27 MMOL/L (ref 21–32)
CO2 SERPL-SCNC: 28 MMOL/L (ref 21–32)
CO2 SERPL-SCNC: 30 MMOL/L (ref 21–32)
CO2 SERPL-SCNC: 31 MMOL/L (ref 21–32)
CO2 SERPL-SCNC: 35 MMOL/L (ref 21–32)
COHGB MFR BLD: 0.2 % SAT (ref 0–3)
COHGB MFR BLD: 0.3 % SAT (ref 0–3)
COHGB MFR BLD: 0.4 % SAT (ref 0–3)
COHGB MFR BLD: 1 % SAT (ref 0–3)
COHGB MFR BLD: 1.1 % SAT (ref 0–3)
COHGB MFR BLD: 1.2 % SAT (ref 0–3)
COHGB MFR BLD: 1.6 % SAT (ref 0–3)
COHGB MFR BLD: 1.7 % SAT (ref 0–3)
COLOR UR AUTO: YELLOW
CORONAVIRUS 229E PCR:: NOT DETECTED
CORONAVIRUS 229E PCR:: NOT DETECTED
CORONAVIRUS HKU1 PCR:: NOT DETECTED
CORONAVIRUS HKU1 PCR:: NOT DETECTED
CORONAVIRUS NL63 PCR:: NOT DETECTED
CORONAVIRUS NL63 PCR:: NOT DETECTED
CORONAVIRUS OC43 PCR:: NOT DETECTED
CORONAVIRUS OC43 PCR:: NOT DETECTED
CREAT BLD-MCNC: 1.69 MG/DL
CREAT BLD-MCNC: 1.74 MG/DL
CREAT BLD-MCNC: 1.75 MG/DL
CREAT BLD-MCNC: 1.8 MG/DL
CREAT BLD-MCNC: 1.91 MG/DL
CREAT BLD-MCNC: 1.95 MG/DL
CREAT BLD-MCNC: 1.96 MG/DL
CREAT BLD-MCNC: 2 MG/DL
CREAT BLD-MCNC: 2.02 MG/DL
CREAT BLD-MCNC: 2.03 MG/DL
CREAT BLD-MCNC: 2.15 MG/DL
CREAT BLD-MCNC: 2.3 MG/DL
CREAT BLD-MCNC: 2.38 MG/DL
CREAT BLD-MCNC: 2.38 MG/DL
CREAT BLD-MCNC: 2.4 MG/DL
CREAT BLD-MCNC: 2.4 MG/DL
CREAT BLD-MCNC: 2.41 MG/DL
CREAT BLD-MCNC: 2.57 MG/DL
CREAT BLD-MCNC: 2.61 MG/DL
CREAT BLD-MCNC: 2.79 MG/DL
CREAT BLD-MCNC: 3.38 MG/DL
CREAT BLD-MCNC: 3.4 MG/DL
CREAT BLD-MCNC: 3.68 MG/DL
CREAT UR-SCNC: 42.1 MG/DL
CREAT UR-SCNC: 44.2 MG/DL
DEPRECATED HBV CORE AB SER IA-ACNC: 354.5 NG/ML
EOSINOPHIL # BLD AUTO: 0.05 X10(3) UL (ref 0–0.7)
EOSINOPHIL # BLD AUTO: 0.18 X10(3) UL (ref 0–0.7)
EOSINOPHIL # BLD AUTO: 0.18 X10(3) UL (ref 0–0.7)
EOSINOPHIL # BLD AUTO: 0.19 X10(3) UL (ref 0–0.7)
EOSINOPHIL # BLD AUTO: 0.24 X10(3) UL (ref 0–0.7)
EOSINOPHIL # BLD AUTO: 0.25 X10(3) UL (ref 0–0.7)
EOSINOPHIL # BLD AUTO: 0.26 X10(3) UL (ref 0–0.7)
EOSINOPHIL # BLD AUTO: 0.28 X10(3) UL (ref 0–0.7)
EOSINOPHIL # BLD AUTO: 0.29 X10(3) UL (ref 0–0.7)
EOSINOPHIL # BLD AUTO: 0.58 X10(3) UL (ref 0–0.7)
EOSINOPHIL # BLD: 0.15 X10(3) UL (ref 0–0.7)
EOSINOPHIL # BLD: 0.17 X10(3) UL (ref 0–0.7)
EOSINOPHIL # BLD: 0.36 X10(3) UL (ref 0–0.7)
EOSINOPHIL NFR BLD AUTO: 0.4 %
EOSINOPHIL NFR BLD AUTO: 1.5 %
EOSINOPHIL NFR BLD AUTO: 1.6 %
EOSINOPHIL NFR BLD AUTO: 1.9 %
EOSINOPHIL NFR BLD AUTO: 2 %
EOSINOPHIL NFR BLD AUTO: 2 %
EOSINOPHIL NFR BLD AUTO: 2.3 %
EOSINOPHIL NFR BLD AUTO: 2.3 %
EOSINOPHIL NFR BLD AUTO: 2.5 %
EOSINOPHIL NFR BLD AUTO: 4.8 %
EOSINOPHIL NFR BLD: 1 %
EOSINOPHIL NFR BLD: 2 %
EOSINOPHIL NFR BLD: 3 %
ERYTHROCYTE [DISTWIDTH] IN BLOOD BY AUTOMATED COUNT: 12.5 %
ERYTHROCYTE [DISTWIDTH] IN BLOOD BY AUTOMATED COUNT: 12.5 %
ERYTHROCYTE [DISTWIDTH] IN BLOOD BY AUTOMATED COUNT: 12.6 %
ERYTHROCYTE [DISTWIDTH] IN BLOOD BY AUTOMATED COUNT: 12.9 %
ERYTHROCYTE [DISTWIDTH] IN BLOOD BY AUTOMATED COUNT: 13.2 %
ERYTHROCYTE [DISTWIDTH] IN BLOOD BY AUTOMATED COUNT: 13.2 %
ERYTHROCYTE [DISTWIDTH] IN BLOOD BY AUTOMATED COUNT: 13.3 %
ERYTHROCYTE [DISTWIDTH] IN BLOOD BY AUTOMATED COUNT: 13.3 %
ERYTHROCYTE [DISTWIDTH] IN BLOOD BY AUTOMATED COUNT: 13.4 %
ERYTHROCYTE [DISTWIDTH] IN BLOOD BY AUTOMATED COUNT: 13.4 %
ERYTHROCYTE [DISTWIDTH] IN BLOOD BY AUTOMATED COUNT: 13.6 %
ERYTHROCYTE [DISTWIDTH] IN BLOOD BY AUTOMATED COUNT: 14 %
ERYTHROCYTE [DISTWIDTH] IN BLOOD BY AUTOMATED COUNT: 14.7 %
ERYTHROCYTE [DISTWIDTH] IN BLOOD BY AUTOMATED COUNT: 16 %
ERYTHROCYTE [DISTWIDTH] IN BLOOD BY AUTOMATED COUNT: 16.9 %
EST. AVERAGE GLUCOSE BLD GHB EST-MCNC: 272 MG/DL (ref 68–126)
FIO2: 100 %
FIO2: 100 %
FIO2: 40 %
FIO2: 80 %
FLUAV RNA SPEC QL NAA+PROBE: NOT DETECTED
FLUAV RNA SPEC QL NAA+PROBE: NOT DETECTED
FLUBV RNA SPEC QL NAA+PROBE: NOT DETECTED
FLUBV RNA SPEC QL NAA+PROBE: NOT DETECTED
GAMMA GLOB SERPL ELPH-MCNC: 1.03 G/DL (ref 0.62–1.7)
GFR SERPLBLD BASED ON 1.73 SQ M-ARVRAT: 17 ML/MIN/1.73M2 (ref 60–?)
GFR SERPLBLD BASED ON 1.73 SQ M-ARVRAT: 19 ML/MIN/1.73M2 (ref 60–?)
GFR SERPLBLD BASED ON 1.73 SQ M-ARVRAT: 19 ML/MIN/1.73M2 (ref 60–?)
GFR SERPLBLD BASED ON 1.73 SQ M-ARVRAT: 24 ML/MIN/1.73M2 (ref 60–?)
GFR SERPLBLD BASED ON 1.73 SQ M-ARVRAT: 26 ML/MIN/1.73M2 (ref 60–?)
GFR SERPLBLD BASED ON 1.73 SQ M-ARVRAT: 27 ML/MIN/1.73M2 (ref 60–?)
GFR SERPLBLD BASED ON 1.73 SQ M-ARVRAT: 29 ML/MIN/1.73M2 (ref 60–?)
GFR SERPLBLD BASED ON 1.73 SQ M-ARVRAT: 30 ML/MIN/1.73M2 (ref 60–?)
GFR SERPLBLD BASED ON 1.73 SQ M-ARVRAT: 30 ML/MIN/1.73M2 (ref 60–?)
GFR SERPLBLD BASED ON 1.73 SQ M-ARVRAT: 31 ML/MIN/1.73M2 (ref 60–?)
GFR SERPLBLD BASED ON 1.73 SQ M-ARVRAT: 33 ML/MIN/1.73M2 (ref 60–?)
GFR SERPLBLD BASED ON 1.73 SQ M-ARVRAT: 36 ML/MIN/1.73M2 (ref 60–?)
GFR SERPLBLD BASED ON 1.73 SQ M-ARVRAT: 37 ML/MIN/1.73M2 (ref 60–?)
GFR SERPLBLD BASED ON 1.73 SQ M-ARVRAT: 37 ML/MIN/1.73M2 (ref 60–?)
GFR SERPLBLD BASED ON 1.73 SQ M-ARVRAT: 38 ML/MIN/1.73M2 (ref 60–?)
GFR SERPLBLD BASED ON 1.73 SQ M-ARVRAT: 41 ML/MIN/1.73M2 (ref 60–?)
GFR SERPLBLD BASED ON 1.73 SQ M-ARVRAT: 43 ML/MIN/1.73M2 (ref 60–?)
GFR SERPLBLD BASED ON 1.73 SQ M-ARVRAT: 43 ML/MIN/1.73M2 (ref 60–?)
GFR SERPLBLD BASED ON 1.73 SQ M-ARVRAT: 44 ML/MIN/1.73M2 (ref 60–?)
GLOBULIN PLAS-MCNC: 3.6 G/DL (ref 2.8–4.4)
GLOBULIN PLAS-MCNC: 3.7 G/DL (ref 2.8–4.4)
GLOBULIN PLAS-MCNC: 3.8 G/DL (ref 2.8–4.4)
GLOBULIN PLAS-MCNC: 3.8 G/DL (ref 2.8–4.4)
GLOBULIN PLAS-MCNC: 4 G/DL (ref 2.8–4.4)
GLOBULIN PLAS-MCNC: 4.1 G/DL (ref 2.8–4.4)
GLOBULIN PLAS-MCNC: 4.1 G/DL (ref 2.8–4.4)
GLOBULIN PLAS-MCNC: 4.2 G/DL (ref 2.8–4.4)
GLUCOSE BLD-MCNC: 102 MG/DL (ref 70–99)
GLUCOSE BLD-MCNC: 105 MG/DL (ref 70–99)
GLUCOSE BLD-MCNC: 108 MG/DL (ref 70–99)
GLUCOSE BLD-MCNC: 111 MG/DL (ref 70–99)
GLUCOSE BLD-MCNC: 112 MG/DL (ref 70–99)
GLUCOSE BLD-MCNC: 113 MG/DL (ref 70–99)
GLUCOSE BLD-MCNC: 117 MG/DL (ref 70–99)
GLUCOSE BLD-MCNC: 123 MG/DL (ref 70–99)
GLUCOSE BLD-MCNC: 125 MG/DL (ref 70–99)
GLUCOSE BLD-MCNC: 128 MG/DL (ref 70–99)
GLUCOSE BLD-MCNC: 129 MG/DL (ref 70–99)
GLUCOSE BLD-MCNC: 137 MG/DL (ref 70–99)
GLUCOSE BLD-MCNC: 138 MG/DL (ref 70–99)
GLUCOSE BLD-MCNC: 138 MG/DL (ref 70–99)
GLUCOSE BLD-MCNC: 139 MG/DL (ref 70–99)
GLUCOSE BLD-MCNC: 142 MG/DL (ref 70–99)
GLUCOSE BLD-MCNC: 143 MG/DL (ref 70–99)
GLUCOSE BLD-MCNC: 151 MG/DL (ref 70–99)
GLUCOSE BLD-MCNC: 152 MG/DL (ref 70–99)
GLUCOSE BLD-MCNC: 160 MG/DL (ref 70–99)
GLUCOSE BLD-MCNC: 165 MG/DL (ref 70–99)
GLUCOSE BLD-MCNC: 166 MG/DL (ref 70–99)
GLUCOSE BLD-MCNC: 167 MG/DL (ref 70–99)
GLUCOSE BLD-MCNC: 170 MG/DL (ref 70–99)
GLUCOSE BLD-MCNC: 172 MG/DL (ref 70–99)
GLUCOSE BLD-MCNC: 173 MG/DL (ref 70–99)
GLUCOSE BLD-MCNC: 173 MG/DL (ref 70–99)
GLUCOSE BLD-MCNC: 175 MG/DL (ref 70–99)
GLUCOSE BLD-MCNC: 177 MG/DL (ref 70–99)
GLUCOSE BLD-MCNC: 177 MG/DL (ref 70–99)
GLUCOSE BLD-MCNC: 178 MG/DL (ref 70–99)
GLUCOSE BLD-MCNC: 179 MG/DL (ref 70–99)
GLUCOSE BLD-MCNC: 181 MG/DL (ref 70–99)
GLUCOSE BLD-MCNC: 183 MG/DL (ref 70–99)
GLUCOSE BLD-MCNC: 184 MG/DL (ref 70–99)
GLUCOSE BLD-MCNC: 184 MG/DL (ref 70–99)
GLUCOSE BLD-MCNC: 188 MG/DL (ref 70–99)
GLUCOSE BLD-MCNC: 188 MG/DL (ref 70–99)
GLUCOSE BLD-MCNC: 190 MG/DL (ref 70–99)
GLUCOSE BLD-MCNC: 191 MG/DL (ref 70–99)
GLUCOSE BLD-MCNC: 191 MG/DL (ref 70–99)
GLUCOSE BLD-MCNC: 192 MG/DL (ref 70–99)
GLUCOSE BLD-MCNC: 193 MG/DL (ref 70–99)
GLUCOSE BLD-MCNC: 194 MG/DL (ref 70–99)
GLUCOSE BLD-MCNC: 196 MG/DL (ref 70–99)
GLUCOSE BLD-MCNC: 197 MG/DL (ref 70–99)
GLUCOSE BLD-MCNC: 198 MG/DL (ref 70–99)
GLUCOSE BLD-MCNC: 199 MG/DL (ref 70–99)
GLUCOSE BLD-MCNC: 199 MG/DL (ref 70–99)
GLUCOSE BLD-MCNC: 200 MG/DL (ref 70–99)
GLUCOSE BLD-MCNC: 202 MG/DL (ref 70–99)
GLUCOSE BLD-MCNC: 204 MG/DL (ref 70–99)
GLUCOSE BLD-MCNC: 205 MG/DL (ref 70–99)
GLUCOSE BLD-MCNC: 206 MG/DL (ref 70–99)
GLUCOSE BLD-MCNC: 206 MG/DL (ref 70–99)
GLUCOSE BLD-MCNC: 207 MG/DL (ref 70–99)
GLUCOSE BLD-MCNC: 208 MG/DL (ref 70–99)
GLUCOSE BLD-MCNC: 209 MG/DL (ref 70–99)
GLUCOSE BLD-MCNC: 210 MG/DL (ref 70–99)
GLUCOSE BLD-MCNC: 210 MG/DL (ref 70–99)
GLUCOSE BLD-MCNC: 211 MG/DL (ref 70–99)
GLUCOSE BLD-MCNC: 211 MG/DL (ref 70–99)
GLUCOSE BLD-MCNC: 213 MG/DL (ref 70–99)
GLUCOSE BLD-MCNC: 213 MG/DL (ref 70–99)
GLUCOSE BLD-MCNC: 219 MG/DL (ref 70–99)
GLUCOSE BLD-MCNC: 220 MG/DL (ref 70–99)
GLUCOSE BLD-MCNC: 221 MG/DL (ref 70–99)
GLUCOSE BLD-MCNC: 222 MG/DL (ref 70–99)
GLUCOSE BLD-MCNC: 226 MG/DL (ref 70–99)
GLUCOSE BLD-MCNC: 232 MG/DL (ref 70–99)
GLUCOSE BLD-MCNC: 234 MG/DL (ref 70–99)
GLUCOSE BLD-MCNC: 252 MG/DL (ref 70–99)
GLUCOSE BLD-MCNC: 256 MG/DL (ref 70–99)
GLUCOSE BLD-MCNC: 260 MG/DL (ref 70–99)
GLUCOSE BLD-MCNC: 263 MG/DL (ref 70–99)
GLUCOSE BLD-MCNC: 269 MG/DL (ref 70–99)
GLUCOSE BLD-MCNC: 274 MG/DL (ref 70–99)
GLUCOSE BLD-MCNC: 277 MG/DL (ref 70–99)
GLUCOSE BLD-MCNC: 282 MG/DL (ref 70–99)
GLUCOSE BLD-MCNC: 295 MG/DL (ref 70–99)
GLUCOSE BLD-MCNC: 298 MG/DL (ref 70–99)
GLUCOSE BLD-MCNC: 298 MG/DL (ref 70–99)
GLUCOSE BLD-MCNC: 315 MG/DL (ref 70–99)
GLUCOSE BLD-MCNC: 327 MG/DL (ref 70–99)
GLUCOSE BLD-MCNC: 344 MG/DL (ref 70–99)
GLUCOSE BLD-MCNC: 354 MG/DL (ref 70–99)
GLUCOSE BLD-MCNC: 363 MG/DL (ref 70–99)
GLUCOSE BLD-MCNC: 43 MG/DL (ref 70–99)
GLUCOSE BLD-MCNC: 50 MG/DL (ref 70–99)
GLUCOSE BLD-MCNC: 51 MG/DL (ref 70–99)
GLUCOSE BLD-MCNC: 84 MG/DL (ref 70–99)
GLUCOSE BLD-MCNC: 88 MG/DL (ref 70–99)
GLUCOSE BLD-MCNC: 96 MG/DL (ref 70–99)
GLUCOSE BLD-MCNC: 97 MG/DL (ref 70–99)
GLUCOSE UR STRIP.AUTO-MCNC: 100 MG/DL
HBA1C MFR BLD: 11.1 % (ref ?–5.7)
HCO3 BLDA-SCNC: 21 MEQ/L (ref 21–27)
HCO3 BLDA-SCNC: 21.2 MEQ/L (ref 21–27)
HCO3 BLDA-SCNC: 21.6 MEQ/L (ref 21–27)
HCO3 BLDA-SCNC: 23.2 MEQ/L (ref 21–27)
HCO3 BLDA-SCNC: 23.9 MEQ/L (ref 21–27)
HCO3 BLDA-SCNC: 24.8 MEQ/L (ref 21–27)
HCO3 BLDA-SCNC: 26.9 MEQ/L (ref 21–27)
HCO3 BLDA-SCNC: 28.1 MEQ/L (ref 21–27)
HCT VFR BLD AUTO: 24.7 %
HCT VFR BLD AUTO: 24.8 %
HCT VFR BLD AUTO: 26.1 %
HCT VFR BLD AUTO: 27.2 %
HCT VFR BLD AUTO: 27.2 %
HCT VFR BLD AUTO: 27.4 %
HCT VFR BLD AUTO: 27.6 %
HCT VFR BLD AUTO: 28.1 %
HCT VFR BLD AUTO: 28.5 %
HCT VFR BLD AUTO: 28.6 %
HCT VFR BLD AUTO: 29.2 %
HCT VFR BLD AUTO: 29.7 %
HCT VFR BLD AUTO: 30.7 %
HCT VFR BLD AUTO: 31.1 %
HCT VFR BLD AUTO: 35 %
HDLC SERPL-MCNC: 38 MG/DL (ref 40–59)
HGB BLD-MCNC: 10.1 G/DL
HGB BLD-MCNC: 10.2 G/DL
HGB BLD-MCNC: 10.3 G/DL
HGB BLD-MCNC: 10.3 G/DL
HGB BLD-MCNC: 10.5 G/DL
HGB BLD-MCNC: 10.9 G/DL
HGB BLD-MCNC: 7.5 G/DL
HGB BLD-MCNC: 7.9 G/DL
HGB BLD-MCNC: 8 G/DL
HGB BLD-MCNC: 8.6 G/DL
HGB BLD-MCNC: 8.7 G/DL
HGB BLD-MCNC: 8.7 G/DL
HGB BLD-MCNC: 8.9 G/DL
HGB BLD-MCNC: 9.2 G/DL
HGB BLD-MCNC: 9.3 G/DL
HGB BLD-MCNC: 9.4 G/DL
HGB BLD-MCNC: 9.5 G/DL
HGB BLD-MCNC: 9.5 G/DL
HGB BLD-MCNC: 9.9 G/DL
IMM GRANULOCYTES # BLD AUTO: 0.11 X10(3) UL (ref 0–1)
IMM GRANULOCYTES # BLD AUTO: 0.12 X10(3) UL (ref 0–1)
IMM GRANULOCYTES # BLD AUTO: 0.12 X10(3) UL (ref 0–1)
IMM GRANULOCYTES # BLD AUTO: 0.14 X10(3) UL (ref 0–1)
IMM GRANULOCYTES # BLD AUTO: 0.14 X10(3) UL (ref 0–1)
IMM GRANULOCYTES # BLD AUTO: 0.2 X10(3) UL (ref 0–1)
IMM GRANULOCYTES # BLD AUTO: 0.21 X10(3) UL (ref 0–1)
IMM GRANULOCYTES # BLD AUTO: 0.23 X10(3) UL (ref 0–1)
IMM GRANULOCYTES # BLD AUTO: 0.37 X10(3) UL (ref 0–1)
IMM GRANULOCYTES # BLD AUTO: 0.48 X10(3) UL (ref 0–1)
IMM GRANULOCYTES NFR BLD: 0.8 %
IMM GRANULOCYTES NFR BLD: 1 %
IMM GRANULOCYTES NFR BLD: 1 %
IMM GRANULOCYTES NFR BLD: 1.1 %
IMM GRANULOCYTES NFR BLD: 1.5 %
IMM GRANULOCYTES NFR BLD: 1.8 %
IMM GRANULOCYTES NFR BLD: 2 %
IMM GRANULOCYTES NFR BLD: 2.5 %
IMM GRANULOCYTES NFR BLD: 2.6 %
IMM GRANULOCYTES NFR BLD: 3.8 %
INR BLD: 1.35 (ref 0.85–1.16)
IRON SATN MFR SERPL: 31 %
IRON SERPL-MCNC: 59 UG/DL
KAPPA LC FREE SER-MCNC: 6.91 MG/DL (ref 0.33–1.94)
KAPPA LC FREE/LAMBDA FREE SER NEPH: 0.94 {RATIO} (ref 0.26–1.65)
KETONES UR STRIP.AUTO-MCNC: NEGATIVE MG/DL
L/M: 15 L/MIN
L/M: 2 L/MIN
L/M: 3 L/MIN
LACTATE BLD-SCNC: 0.6 MMOL/L (ref 0.5–2)
LACTATE BLD-SCNC: 0.8 MMOL/L (ref 0.5–2)
LACTATE BLD-SCNC: 0.8 MMOL/L (ref 0.5–2)
LACTATE BLD-SCNC: 5.6 MMOL/L (ref 0.5–2)
LACTATE BLD-SCNC: 5.9 MMOL/L (ref 0.5–2)
LAMBDA LC FREE SERPL-MCNC: 7.33 MG/DL (ref 0.57–2.63)
LDLC SERPL CALC-MCNC: 42 MG/DL (ref ?–100)
LEUKOCYTE ESTERASE UR QL STRIP.AUTO: NEGATIVE
LYMPHOCYTES # BLD AUTO: 0.56 X10(3) UL (ref 1–4)
LYMPHOCYTES # BLD AUTO: 0.6 X10(3) UL (ref 1–4)
LYMPHOCYTES # BLD AUTO: 0.65 X10(3) UL (ref 1–4)
LYMPHOCYTES # BLD AUTO: 0.66 X10(3) UL (ref 1–4)
LYMPHOCYTES # BLD AUTO: 0.71 X10(3) UL (ref 1–4)
LYMPHOCYTES # BLD AUTO: 0.72 X10(3) UL (ref 1–4)
LYMPHOCYTES # BLD AUTO: 0.76 X10(3) UL (ref 1–4)
LYMPHOCYTES # BLD AUTO: 0.9 X10(3) UL (ref 1–4)
LYMPHOCYTES # BLD AUTO: 0.92 X10(3) UL (ref 1–4)
LYMPHOCYTES # BLD AUTO: 0.93 X10(3) UL (ref 1–4)
LYMPHOCYTES NFR BLD AUTO: 4.4 %
LYMPHOCYTES NFR BLD AUTO: 4.4 %
LYMPHOCYTES NFR BLD AUTO: 4.5 %
LYMPHOCYTES NFR BLD AUTO: 5.4 %
LYMPHOCYTES NFR BLD AUTO: 6.4 %
LYMPHOCYTES NFR BLD AUTO: 7.2 %
LYMPHOCYTES NFR BLD AUTO: 7.4 %
LYMPHOCYTES NFR BLD AUTO: 7.5 %
LYMPHOCYTES NFR BLD AUTO: 8.4 %
LYMPHOCYTES NFR BLD AUTO: 8.8 %
LYMPHOCYTES NFR BLD: 0.48 X10(3) UL (ref 1–4)
LYMPHOCYTES NFR BLD: 0.69 X10(3) UL (ref 1–4)
LYMPHOCYTES NFR BLD: 0.89 X10(3) UL (ref 1–4)
LYMPHOCYTES NFR BLD: 4 %
LYMPHOCYTES NFR BLD: 6 %
LYMPHOCYTES NFR BLD: 8 %
MAGNESIUM SERPL-MCNC: 1.8 MG/DL (ref 1.6–2.6)
MAGNESIUM SERPL-MCNC: 1.9 MG/DL (ref 1.6–2.6)
MAGNESIUM SERPL-MCNC: 2.1 MG/DL (ref 1.6–2.6)
MAGNESIUM SERPL-MCNC: 2.2 MG/DL (ref 1.6–2.6)
MAGNESIUM SERPL-MCNC: 2.2 MG/DL (ref 1.6–2.6)
MAGNESIUM SERPL-MCNC: 2.3 MG/DL (ref 1.6–2.6)
MAGNESIUM SERPL-MCNC: 2.4 MG/DL (ref 1.6–2.6)
MAGNESIUM SERPL-MCNC: 2.4 MG/DL (ref 1.6–2.6)
MAGNESIUM SERPL-MCNC: 2.5 MG/DL (ref 1.6–2.6)
MAGNESIUM SERPL-MCNC: 2.5 MG/DL (ref 1.6–2.6)
MAGNESIUM SERPL-MCNC: 2.7 MG/DL (ref 1.6–2.6)
MAGNESIUM SERPL-MCNC: 2.8 MG/DL (ref 1.6–2.6)
MCH RBC QN AUTO: 30.6 PG (ref 26–34)
MCH RBC QN AUTO: 30.8 PG (ref 26–34)
MCH RBC QN AUTO: 30.9 PG (ref 26–34)
MCH RBC QN AUTO: 31 PG (ref 26–34)
MCH RBC QN AUTO: 31.1 PG (ref 26–34)
MCH RBC QN AUTO: 31.1 PG (ref 26–34)
MCH RBC QN AUTO: 31.2 PG (ref 26–34)
MCH RBC QN AUTO: 31.4 PG (ref 26–34)
MCH RBC QN AUTO: 31.4 PG (ref 26–34)
MCH RBC QN AUTO: 31.5 PG (ref 26–34)
MCH RBC QN AUTO: 31.6 PG (ref 26–34)
MCH RBC QN AUTO: 31.7 PG (ref 26–34)
MCHC RBC AUTO-ENTMCNC: 30 G/DL (ref 31–37)
MCHC RBC AUTO-ENTMCNC: 30.4 G/DL (ref 31–37)
MCHC RBC AUTO-ENTMCNC: 30.7 G/DL (ref 31–37)
MCHC RBC AUTO-ENTMCNC: 31.6 G/DL (ref 31–37)
MCHC RBC AUTO-ENTMCNC: 31.8 G/DL (ref 31–37)
MCHC RBC AUTO-ENTMCNC: 31.8 G/DL (ref 31–37)
MCHC RBC AUTO-ENTMCNC: 31.9 G/DL (ref 31–37)
MCHC RBC AUTO-ENTMCNC: 32 G/DL (ref 31–37)
MCHC RBC AUTO-ENTMCNC: 32 G/DL (ref 31–37)
MCHC RBC AUTO-ENTMCNC: 32.2 G/DL (ref 31–37)
MCHC RBC AUTO-ENTMCNC: 32.6 G/DL (ref 31–37)
MCHC RBC AUTO-ENTMCNC: 32.7 G/DL (ref 31–37)
MCHC RBC AUTO-ENTMCNC: 32.8 G/DL (ref 31–37)
MCHC RBC AUTO-ENTMCNC: 32.9 G/DL (ref 31–37)
MCHC RBC AUTO-ENTMCNC: 32.9 G/DL (ref 31–37)
MCV RBC AUTO: 101.6 FL
MCV RBC AUTO: 103.8 FL
MCV RBC AUTO: 105.7 FL
MCV RBC AUTO: 93 FL
MCV RBC AUTO: 94 FL
MCV RBC AUTO: 94.1 FL
MCV RBC AUTO: 94.5 FL
MCV RBC AUTO: 94.7 FL
MCV RBC AUTO: 97 FL
MCV RBC AUTO: 97.1 FL
MCV RBC AUTO: 97.2 FL
MCV RBC AUTO: 97.5 FL
MCV RBC AUTO: 98 FL
MCV RBC AUTO: 98.6 FL
MCV RBC AUTO: 99.2 FL
METAPNEUMOVIRUS PCR:: NOT DETECTED
METAPNEUMOVIRUS PCR:: NOT DETECTED
METHGB MFR BLD: 0 % SAT (ref 0.4–1.5)
METHGB MFR BLD: 0.2 % SAT (ref 0.4–1.5)
MICROALBUMIN UR-MCNC: 31.4 MG/DL
MICROALBUMIN/CREAT 24H UR-RTO: 710.4 UG/MG (ref ?–30)
MONOCYTES # BLD AUTO: 0.49 X10(3) UL (ref 0.1–1)
MONOCYTES # BLD AUTO: 0.53 X10(3) UL (ref 0.1–1)
MONOCYTES # BLD AUTO: 0.57 X10(3) UL (ref 0.1–1)
MONOCYTES # BLD AUTO: 0.57 X10(3) UL (ref 0.1–1)
MONOCYTES # BLD AUTO: 0.61 X10(3) UL (ref 0.1–1)
MONOCYTES # BLD AUTO: 0.65 X10(3) UL (ref 0.1–1)
MONOCYTES # BLD AUTO: 0.69 X10(3) UL (ref 0.1–1)
MONOCYTES # BLD AUTO: 0.69 X10(3) UL (ref 0.1–1)
MONOCYTES # BLD AUTO: 0.75 X10(3) UL (ref 0.1–1)
MONOCYTES # BLD AUTO: 0.78 X10(3) UL (ref 0.1–1)
MONOCYTES # BLD: 0 X10(3) UL (ref 0.1–1)
MONOCYTES # BLD: 0.34 X10(3) UL (ref 0.1–1)
MONOCYTES # BLD: 0.45 X10(3) UL (ref 0.1–1)
MONOCYTES NFR BLD AUTO: 3.8 %
MONOCYTES NFR BLD AUTO: 4.2 %
MONOCYTES NFR BLD AUTO: 4.2 %
MONOCYTES NFR BLD AUTO: 5.3 %
MONOCYTES NFR BLD AUTO: 5.5 %
MONOCYTES NFR BLD AUTO: 5.7 %
MONOCYTES NFR BLD AUTO: 5.9 %
MONOCYTES NFR BLD AUTO: 6 %
MONOCYTES NFR BLD AUTO: 6.2 %
MONOCYTES NFR BLD AUTO: 7.9 %
MONOCYTES NFR BLD: 0 %
MONOCYTES NFR BLD: 3 %
MONOCYTES NFR BLD: 4 %
MORPHOLOGY: NORMAL
MYCOPLASMA PNEUMONIA PCR:: NOT DETECTED
MYCOPLASMA PNEUMONIA PCR:: NOT DETECTED
NEUTROPHILS # BLD AUTO: 10.06 X10 (3) UL (ref 1.5–7.7)
NEUTROPHILS # BLD AUTO: 10.06 X10(3) UL (ref 1.5–7.7)
NEUTROPHILS # BLD AUTO: 10.38 X10 (3) UL (ref 1.5–7.7)
NEUTROPHILS # BLD AUTO: 10.38 X10(3) UL (ref 1.5–7.7)
NEUTROPHILS # BLD AUTO: 10.73 X10 (3) UL (ref 1.5–7.7)
NEUTROPHILS # BLD AUTO: 10.73 X10(3) UL (ref 1.5–7.7)
NEUTROPHILS # BLD AUTO: 10.94 X10 (3) UL (ref 1.5–7.7)
NEUTROPHILS # BLD AUTO: 10.94 X10(3) UL (ref 1.5–7.7)
NEUTROPHILS # BLD AUTO: 11.27 X10 (3) UL (ref 1.5–7.7)
NEUTROPHILS # BLD AUTO: 11.27 X10(3) UL (ref 1.5–7.7)
NEUTROPHILS # BLD AUTO: 12.54 X10 (3) UL (ref 1.5–7.7)
NEUTROPHILS # BLD AUTO: 12.54 X10(3) UL (ref 1.5–7.7)
NEUTROPHILS # BLD AUTO: 12.74 X10 (3) UL (ref 1.5–7.7)
NEUTROPHILS # BLD AUTO: 13.17 X10 (3) UL (ref 1.5–7.7)
NEUTROPHILS # BLD AUTO: 13.17 X10(3) UL (ref 1.5–7.7)
NEUTROPHILS # BLD AUTO: 5.67 X10 (3) UL (ref 1.5–7.7)
NEUTROPHILS # BLD AUTO: 5.67 X10(3) UL (ref 1.5–7.7)
NEUTROPHILS # BLD AUTO: 6.52 X10 (3) UL (ref 1.5–7.7)
NEUTROPHILS # BLD AUTO: 6.52 X10(3) UL (ref 1.5–7.7)
NEUTROPHILS # BLD AUTO: 6.75 X10 (3) UL (ref 1.5–7.7)
NEUTROPHILS # BLD AUTO: 9.71 X10 (3) UL (ref 1.5–7.7)
NEUTROPHILS # BLD AUTO: 9.73 X10 (3) UL (ref 1.5–7.7)
NEUTROPHILS # BLD AUTO: 9.73 X10(3) UL (ref 1.5–7.7)
NEUTROPHILS NFR BLD AUTO: 79.1 %
NEUTROPHILS NFR BLD AUTO: 80.2 %
NEUTROPHILS NFR BLD AUTO: 80.7 %
NEUTROPHILS NFR BLD AUTO: 82.9 %
NEUTROPHILS NFR BLD AUTO: 83.6 %
NEUTROPHILS NFR BLD AUTO: 84.5 %
NEUTROPHILS NFR BLD AUTO: 86.3 %
NEUTROPHILS NFR BLD AUTO: 86.5 %
NEUTROPHILS NFR BLD AUTO: 86.8 %
NEUTROPHILS NFR BLD AUTO: 88.6 %
NEUTROPHILS NFR BLD: 84 %
NEUTROPHILS NFR BLD: 86 %
NEUTROPHILS NFR BLD: 90 %
NEUTS BAND NFR BLD: 3 %
NEUTS BAND NFR BLD: 6 %
NEUTS HYPERSEG # BLD: 11.07 X10(3) UL (ref 1.5–7.7)
NEUTS HYPERSEG # BLD: 13.41 X10(3) UL (ref 1.5–7.7)
NEUTS HYPERSEG # BLD: 7.4 X10(3) UL (ref 1.5–7.7)
NITRITE UR QL STRIP.AUTO: NEGATIVE
NONHDLC SERPL-MCNC: 65 MG/DL (ref ?–130)
NRBC BLD MANUAL-RTO: 3 %
OSMOLALITY SERPL CALC.SUM OF ELEC: 298 MOSM/KG (ref 275–295)
OSMOLALITY SERPL CALC.SUM OF ELEC: 298 MOSM/KG (ref 275–295)
OSMOLALITY SERPL CALC.SUM OF ELEC: 302 MOSM/KG (ref 275–295)
OSMOLALITY SERPL CALC.SUM OF ELEC: 303 MOSM/KG (ref 275–295)
OSMOLALITY SERPL CALC.SUM OF ELEC: 303 MOSM/KG (ref 275–295)
OSMOLALITY SERPL CALC.SUM OF ELEC: 305 MOSM/KG (ref 275–295)
OSMOLALITY SERPL CALC.SUM OF ELEC: 318 MOSM/KG (ref 275–295)
OSMOLALITY SERPL CALC.SUM OF ELEC: 323 MOSM/KG (ref 275–295)
OSMOLALITY SERPL CALC.SUM OF ELEC: 324 MOSM/KG (ref 275–295)
OSMOLALITY SERPL CALC.SUM OF ELEC: 324 MOSM/KG (ref 275–295)
OSMOLALITY SERPL CALC.SUM OF ELEC: 329 MOSM/KG (ref 275–295)
OSMOLALITY SERPL CALC.SUM OF ELEC: 331 MOSM/KG (ref 275–295)
OSMOLALITY SERPL CALC.SUM OF ELEC: 332 MOSM/KG (ref 275–295)
OSMOLALITY SERPL CALC.SUM OF ELEC: 332 MOSM/KG (ref 275–295)
OSMOLALITY SERPL CALC.SUM OF ELEC: 333 MOSM/KG (ref 275–295)
OSMOLALITY SERPL CALC.SUM OF ELEC: 335 MOSM/KG (ref 275–295)
OSMOLALITY SERPL CALC.SUM OF ELEC: 336 MOSM/KG (ref 275–295)
OSMOLALITY SERPL CALC.SUM OF ELEC: 337 MOSM/KG (ref 275–295)
OSMOLALITY SERPL CALC.SUM OF ELEC: 339 MOSM/KG (ref 275–295)
OSMOLALITY SERPL CALC.SUM OF ELEC: 339 MOSM/KG (ref 275–295)
OSMOLALITY SERPL CALC.SUM OF ELEC: 345 MOSM/KG (ref 275–295)
OXYHGB MFR BLDA: 67.9 % (ref 92–100)
OXYHGB MFR BLDA: 87.6 % (ref 92–100)
OXYHGB MFR BLDA: 94.1 % (ref 92–100)
OXYHGB MFR BLDA: 95.9 % (ref 92–100)
OXYHGB MFR BLDA: 96.1 % (ref 92–100)
OXYHGB MFR BLDA: 96.3 % (ref 92–100)
OXYHGB MFR BLDA: 96.4 % (ref 92–100)
OXYHGB MFR BLDA: 97.8 % (ref 92–100)
P/F RATIO: 180 MMHG
P/F RATIO: 235 MMHG
PARAINFLUENZA 1 PCR:: NOT DETECTED
PARAINFLUENZA 1 PCR:: NOT DETECTED
PARAINFLUENZA 2 PCR:: NOT DETECTED
PARAINFLUENZA 2 PCR:: NOT DETECTED
PARAINFLUENZA 3 PCR:: NOT DETECTED
PARAINFLUENZA 3 PCR:: NOT DETECTED
PARAINFLUENZA 4 PCR:: NOT DETECTED
PARAINFLUENZA 4 PCR:: NOT DETECTED
PCO2 BLDA: 30 MM HG (ref 35–45)
PCO2 BLDA: 32 MM HG (ref 35–45)
PCO2 BLDA: 32 MM HG (ref 35–45)
PCO2 BLDA: 34 MM HG (ref 35–45)
PCO2 BLDA: 35 MM HG (ref 35–45)
PCO2 BLDA: 35 MM HG (ref 35–45)
PCO2 BLDA: 73 MM HG (ref 35–45)
PCO2 BLDA: 76 MM HG (ref 35–45)
PEEP: 5 CM H2O
PEEP: 8 CM H2O
PH BLDA: 7.24 [PH] (ref 7.35–7.45)
PH BLDA: 7.25 [PH] (ref 7.35–7.45)
PH BLDA: 7.36 [PH] (ref 7.35–7.45)
PH BLDA: 7.39 [PH] (ref 7.35–7.45)
PH BLDA: 7.41 [PH] (ref 7.35–7.45)
PH BLDA: 7.42 [PH] (ref 7.35–7.45)
PH BLDA: 7.45 [PH] (ref 7.35–7.45)
PH BLDA: 7.45 [PH] (ref 7.35–7.45)
PH UR STRIP.AUTO: 5.5 [PH] (ref 5–8)
PHOSPHATE SERPL-MCNC: 3.1 MG/DL (ref 2.5–4.9)
PHOSPHATE SERPL-MCNC: 3.1 MG/DL (ref 2.5–4.9)
PHOSPHATE SERPL-MCNC: 3.2 MG/DL (ref 2.5–4.9)
PHOSPHATE SERPL-MCNC: 3.3 MG/DL (ref 2.5–4.9)
PHOSPHATE SERPL-MCNC: 3.3 MG/DL (ref 2.5–4.9)
PHOSPHATE SERPL-MCNC: 3.6 MG/DL (ref 2.5–4.9)
PHOSPHATE SERPL-MCNC: 3.8 MG/DL (ref 2.5–4.9)
PHOSPHATE SERPL-MCNC: 3.8 MG/DL (ref 2.5–4.9)
PHOSPHATE SERPL-MCNC: 4 MG/DL (ref 2.5–4.9)
PHOSPHATE SERPL-MCNC: 4 MG/DL (ref 2.5–4.9)
PHOSPHATE SERPL-MCNC: 4.1 MG/DL (ref 2.5–4.9)
PHOSPHATE SERPL-MCNC: 4.3 MG/DL (ref 2.5–4.9)
PHOSPHATE SERPL-MCNC: 4.4 MG/DL (ref 2.5–4.9)
PHOSPHATE SERPL-MCNC: 4.7 MG/DL (ref 2.5–4.9)
PHOSPHATE SERPL-MCNC: 4.8 MG/DL (ref 2.5–4.9)
PHOSPHATE SERPL-MCNC: 5.2 MG/DL (ref 2.5–4.9)
PHOSPHATE SERPL-MCNC: 6.5 MG/DL (ref 2.5–4.9)
PLATELET # BLD AUTO: 110 10(3)UL (ref 150–450)
PLATELET # BLD AUTO: 115 10(3)UL (ref 150–450)
PLATELET # BLD AUTO: 116 10(3)UL (ref 150–450)
PLATELET # BLD AUTO: 122 10(3)UL (ref 150–450)
PLATELET # BLD AUTO: 154 10(3)UL (ref 150–450)
PLATELET # BLD AUTO: 158 10(3)UL (ref 150–450)
PLATELET # BLD AUTO: 159 10(3)UL (ref 150–450)
PLATELET # BLD AUTO: 162 10(3)UL (ref 150–450)
PLATELET # BLD AUTO: 177 10(3)UL (ref 150–450)
PLATELET # BLD AUTO: 187 10(3)UL (ref 150–450)
PLATELET # BLD AUTO: 191 10(3)UL (ref 150–450)
PLATELET # BLD AUTO: 191 10(3)UL (ref 150–450)
PLATELET # BLD AUTO: 196 10(3)UL (ref 150–450)
PLATELET # BLD AUTO: 197 10(3)UL (ref 150–450)
PLATELET # BLD AUTO: 203 10(3)UL (ref 150–450)
PLATELET # BLD AUTO: 207 10(3)UL (ref 150–450)
PLATELET MORPHOLOGY: NORMAL
PO2 BLDA: 178 MM HG (ref 80–100)
PO2 BLDA: 46 MM HG (ref 80–100)
PO2 BLDA: 65 MM HG (ref 80–100)
PO2 BLDA: 72 MM HG (ref 80–100)
PO2 BLDA: 87 MM HG (ref 80–100)
PO2 BLDA: 90 MM HG (ref 80–100)
PO2 BLDA: 91 MM HG (ref 80–100)
PO2 BLDA: 92 MM HG (ref 80–100)
POTASSIUM BLD-SCNC: 3.9 MMOL/L (ref 3.6–5.1)
POTASSIUM BLD-SCNC: 3.9 MMOL/L (ref 3.6–5.1)
POTASSIUM BLD-SCNC: 4 MMOL/L (ref 3.6–5.1)
POTASSIUM BLD-SCNC: 4.6 MMOL/L (ref 3.6–5.1)
POTASSIUM BLD-SCNC: 4.7 MMOL/L (ref 3.6–5.1)
POTASSIUM BLD-SCNC: 4.7 MMOL/L (ref 3.6–5.1)
POTASSIUM SERPL-SCNC: 3.5 MMOL/L (ref 3.5–5.1)
POTASSIUM SERPL-SCNC: 3.5 MMOL/L (ref 3.5–5.1)
POTASSIUM SERPL-SCNC: 3.6 MMOL/L (ref 3.5–5.1)
POTASSIUM SERPL-SCNC: 3.6 MMOL/L (ref 3.5–5.1)
POTASSIUM SERPL-SCNC: 3.7 MMOL/L (ref 3.5–5.1)
POTASSIUM SERPL-SCNC: 3.7 MMOL/L (ref 3.5–5.1)
POTASSIUM SERPL-SCNC: 3.8 MMOL/L (ref 3.5–5.1)
POTASSIUM SERPL-SCNC: 3.9 MMOL/L (ref 3.5–5.1)
POTASSIUM SERPL-SCNC: 4.2 MMOL/L (ref 3.5–5.1)
POTASSIUM SERPL-SCNC: 4.3 MMOL/L (ref 3.5–5.1)
POTASSIUM SERPL-SCNC: 4.3 MMOL/L (ref 3.5–5.1)
POTASSIUM SERPL-SCNC: 4.5 MMOL/L (ref 3.5–5.1)
POTASSIUM SERPL-SCNC: 4.5 MMOL/L (ref 3.5–5.1)
POTASSIUM SERPL-SCNC: 4.6 MMOL/L (ref 3.5–5.1)
POTASSIUM SERPL-SCNC: 4.8 MMOL/L (ref 3.5–5.1)
POTASSIUM SERPL-SCNC: 5.1 MMOL/L (ref 3.5–5.1)
POTASSIUM SERPL-SCNC: 5.6 MMOL/L (ref 3.5–5.1)
PRESSURE SUPPORT: 5 CM H2O
PRESSURE SUPPORT: 5 CM H2O
PROCALCITONIN SERPL-MCNC: 0.08 NG/ML (ref ?–0.16)
PROCALCITONIN SERPL-MCNC: 0.21 NG/ML (ref ?–0.16)
PROT SERPL-MCNC: 5.3 G/DL (ref 6.4–8.2)
PROT SERPL-MCNC: 5.6 G/DL (ref 6.4–8.2)
PROT SERPL-MCNC: 5.7 G/DL (ref 6.4–8.2)
PROT SERPL-MCNC: 5.7 G/DL (ref 6.4–8.2)
PROT SERPL-MCNC: 5.8 G/DL (ref 6.4–8.2)
PROT SERPL-MCNC: 5.8 G/DL (ref 6.4–8.2)
PROT SERPL-MCNC: 5.9 G/DL (ref 6.4–8.2)
PROT SERPL-MCNC: 5.9 G/DL (ref 6.4–8.2)
PROT SERPL-MCNC: 6 G/DL (ref 6.4–8.2)
PROT SERPL-MCNC: 6 G/DL (ref 6.4–8.2)
PROT SERPL-MCNC: 6.2 G/DL (ref 6.4–8.2)
PROT UR-MCNC: 91.8 MG/DL
PROT/CREAT UR-RTO: 2.18
PROTHROMBIN TIME: 16.6 SECONDS (ref 11.6–14.8)
Q-T INTERVAL: 354 MS
Q-T INTERVAL: 402 MS
QRS DURATION: 110 MS
QRS DURATION: 98 MS
QTC CALCULATION (BEZET): 421 MS
QTC CALCULATION (BEZET): 433 MS
R AXIS: -20 DEGREES
R AXIS: 101 DEGREES
RBC # BLD AUTO: 2.38 X10(6)UL
RBC # BLD AUTO: 2.5 X10(6)UL
RBC # BLD AUTO: 2.57 X10(6)UL
RBC # BLD AUTO: 2.76 X10(6)UL
RBC # BLD AUTO: 2.8 X10(6)UL
RBC # BLD AUTO: 2.82 X10(6)UL
RBC # BLD AUTO: 2.83 X10(6)UL
RBC # BLD AUTO: 2.99 X10(6)UL
RBC # BLD AUTO: 3.01 X10(6)UL
RBC # BLD AUTO: 3.01 X10(6)UL
RBC # BLD AUTO: 3.03 X10(6)UL
RBC # BLD AUTO: 3.04 X10(6)UL
RBC # BLD AUTO: 3.29 X10(6)UL
RBC # BLD AUTO: 3.3 X10(6)UL
RBC # BLD AUTO: 3.31 X10(6)UL
RBC #/AREA URNS AUTO: >10 /HPF
RHINOVIRUS/ENTERO PCR:: NOT DETECTED
RHINOVIRUS/ENTERO PCR:: NOT DETECTED
RSV RNA SPEC QL NAA+PROBE: NOT DETECTED
RSV RNA SPEC QL NAA+PROBE: NOT DETECTED
SARS-COV-2 RNA NPH QL NAA+NON-PROBE: NOT DETECTED
SARS-COV-2 RNA NPH QL NAA+NON-PROBE: NOT DETECTED
SODIUM BLD-SCNC: 137 MMOL/L (ref 135–145)
SODIUM BLD-SCNC: 138 MMOL/L (ref 135–145)
SODIUM BLD-SCNC: 138 MMOL/L (ref 135–145)
SODIUM BLD-SCNC: 148 MMOL/L (ref 135–145)
SODIUM BLD-SCNC: 148 MMOL/L (ref 135–145)
SODIUM BLD-SCNC: 149 MMOL/L (ref 135–145)
SODIUM SERPL-SCNC: 134 MMOL/L (ref 136–145)
SODIUM SERPL-SCNC: 134 MMOL/L (ref 136–145)
SODIUM SERPL-SCNC: 137 MMOL/L (ref 136–145)
SODIUM SERPL-SCNC: 138 MMOL/L (ref 136–145)
SODIUM SERPL-SCNC: 141 MMOL/L (ref 136–145)
SODIUM SERPL-SCNC: 141 MMOL/L (ref 136–145)
SODIUM SERPL-SCNC: 142 MMOL/L (ref 136–145)
SODIUM SERPL-SCNC: 142 MMOL/L (ref 136–145)
SODIUM SERPL-SCNC: 144 MMOL/L (ref 136–145)
SODIUM SERPL-SCNC: 145 MMOL/L (ref 136–145)
SODIUM SERPL-SCNC: 146 MMOL/L (ref 136–145)
SODIUM SERPL-SCNC: 147 MMOL/L (ref 136–145)
SODIUM SERPL-SCNC: 147 MMOL/L (ref 136–145)
SODIUM SERPL-SCNC: 148 MMOL/L (ref 136–145)
SODIUM SERPL-SCNC: 149 MMOL/L (ref 136–145)
SODIUM SERPL-SCNC: 150 MMOL/L (ref 136–145)
SODIUM SERPL-SCNC: 34 MMOL/L
SP GR UR STRIP.AUTO: 1.01 (ref 1–1.03)
T AXIS: 155 DEGREES
T AXIS: 256 DEGREES
TIBC SERPL-MCNC: 189 UG/DL (ref 240–450)
TIDAL VOLUME: 460 ML
TIDAL VOLUME: 550 ML
TOTAL CELLS COUNTED BLD: 100
TRANSFERRIN SERPL-MCNC: 127 MG/DL (ref 200–360)
TRIGL SERPL-MCNC: 128 MG/DL (ref 30–149)
UROBILINOGEN UR STRIP.AUTO-MCNC: 0.2 MG/DL
VENT RATE: 18 /MIN
VENT RATE: 24 /MIN
VENTRICULAR RATE: 66 BPM
VENTRICULAR RATE: 90 BPM
VLDLC SERPL CALC-MCNC: 18 MG/DL (ref 0–30)
WBC # BLD AUTO: 11.9 X10(3) UL (ref 4–11)
WBC # BLD AUTO: 11.9 X10(3) UL (ref 4–11)
WBC # BLD AUTO: 12.1 X10(3) UL (ref 4–11)
WBC # BLD AUTO: 12.5 X10(3) UL (ref 4–11)
WBC # BLD AUTO: 12.6 X10(3) UL (ref 4–11)
WBC # BLD AUTO: 12.7 X10(3) UL (ref 4–11)
WBC # BLD AUTO: 12.8 X10(3) UL (ref 4–11)
WBC # BLD AUTO: 13.1 X10(3) UL (ref 4–11)
WBC # BLD AUTO: 13.3 X10(3) UL (ref 4–11)
WBC # BLD AUTO: 14.5 X10(3) UL (ref 4–11)
WBC # BLD AUTO: 14.9 X10(3) UL (ref 4–11)
WBC # BLD AUTO: 14.9 X10(3) UL (ref 4–11)
WBC # BLD AUTO: 7.2 X10(3) UL (ref 4–11)
WBC # BLD AUTO: 8.1 X10(3) UL (ref 4–11)
WBC # BLD AUTO: 8.6 X10(3) UL (ref 4–11)

## 2022-01-01 PROCEDURE — 80053 COMPREHEN METABOLIC PANEL: CPT | Performed by: NURSE PRACTITIONER

## 2022-01-01 PROCEDURE — 94640 AIRWAY INHALATION TREATMENT: CPT

## 2022-01-01 PROCEDURE — 83735 ASSAY OF MAGNESIUM: CPT | Performed by: INTERNAL MEDICINE

## 2022-01-01 PROCEDURE — P9045 ALBUMIN (HUMAN), 5%, 250 ML: HCPCS | Performed by: NURSE PRACTITIONER

## 2022-01-01 PROCEDURE — 0BH18EZ INSERTION OF ENDOTRACHEAL AIRWAY INTO TRACHEA, VIA NATURAL OR ARTIFICIAL OPENING ENDOSCOPIC: ICD-10-PCS | Performed by: INTERNAL MEDICINE

## 2022-01-01 PROCEDURE — 85025 COMPLETE CBC W/AUTO DIFF WBC: CPT | Performed by: INTERNAL MEDICINE

## 2022-01-01 PROCEDURE — 71045 X-RAY EXAM CHEST 1 VIEW: CPT | Performed by: NURSE PRACTITIONER

## 2022-01-01 PROCEDURE — 83605 ASSAY OF LACTIC ACID: CPT | Performed by: INTERNAL MEDICINE

## 2022-01-01 PROCEDURE — 81015 MICROSCOPIC EXAM OF URINE: CPT | Performed by: NURSE PRACTITIONER

## 2022-01-01 PROCEDURE — 97110 THERAPEUTIC EXERCISES: CPT

## 2022-01-01 PROCEDURE — 84100 ASSAY OF PHOSPHORUS: CPT | Performed by: NURSE PRACTITIONER

## 2022-01-01 PROCEDURE — 82962 GLUCOSE BLOOD TEST: CPT

## 2022-01-01 PROCEDURE — 85027 COMPLETE CBC AUTOMATED: CPT | Performed by: INTERNAL MEDICINE

## 2022-01-01 PROCEDURE — 80053 COMPREHEN METABOLIC PANEL: CPT | Performed by: HOSPITALIST

## 2022-01-01 PROCEDURE — 70450 CT HEAD/BRAIN W/O DYE: CPT | Performed by: NURSE PRACTITIONER

## 2022-01-01 PROCEDURE — 71045 X-RAY EXAM CHEST 1 VIEW: CPT | Performed by: INTERNAL MEDICINE

## 2022-01-01 PROCEDURE — 74018 RADEX ABDOMEN 1 VIEW: CPT | Performed by: INTERNAL MEDICINE

## 2022-01-01 PROCEDURE — 83036 HEMOGLOBIN GLYCOSYLATED A1C: CPT | Performed by: NURSE PRACTITIONER

## 2022-01-01 PROCEDURE — 85025 COMPLETE CBC W/AUTO DIFF WBC: CPT | Performed by: NURSE PRACTITIONER

## 2022-01-01 PROCEDURE — 92526 ORAL FUNCTION THERAPY: CPT

## 2022-01-01 PROCEDURE — 93306 TTE W/DOPPLER COMPLETE: CPT | Performed by: INTERNAL MEDICINE

## 2022-01-01 PROCEDURE — 84295 ASSAY OF SERUM SODIUM: CPT | Performed by: INTERNAL MEDICINE

## 2022-01-01 PROCEDURE — 82375 ASSAY CARBOXYHB QUANT: CPT | Performed by: INTERNAL MEDICINE

## 2022-01-01 PROCEDURE — 84145 PROCALCITONIN (PCT): CPT | Performed by: NURSE PRACTITIONER

## 2022-01-01 PROCEDURE — 92610 EVALUATE SWALLOWING FUNCTION: CPT

## 2022-01-01 PROCEDURE — 83050 HGB METHEMOGLOBIN QUAN: CPT | Performed by: INTERNAL MEDICINE

## 2022-01-01 PROCEDURE — 83550 IRON BINDING TEST: CPT | Performed by: INTERNAL MEDICINE

## 2022-01-01 PROCEDURE — 80069 RENAL FUNCTION PANEL: CPT | Performed by: INTERNAL MEDICINE

## 2022-01-01 PROCEDURE — 86334 IMMUNOFIX E-PHORESIS SERUM: CPT | Performed by: INTERNAL MEDICINE

## 2022-01-01 PROCEDURE — 82803 BLOOD GASES ANY COMBINATION: CPT | Performed by: NURSE PRACTITIONER

## 2022-01-01 PROCEDURE — 85730 THROMBOPLASTIN TIME PARTIAL: CPT | Performed by: INTERNAL MEDICINE

## 2022-01-01 PROCEDURE — 85018 HEMOGLOBIN: CPT | Performed by: INTERNAL MEDICINE

## 2022-01-01 PROCEDURE — 97530 THERAPEUTIC ACTIVITIES: CPT

## 2022-01-01 PROCEDURE — 84132 ASSAY OF SERUM POTASSIUM: CPT | Performed by: NURSE PRACTITIONER

## 2022-01-01 PROCEDURE — 85027 COMPLETE CBC AUTOMATED: CPT | Performed by: NURSE PRACTITIONER

## 2022-01-01 PROCEDURE — 94660 CPAP INITIATION&MGMT: CPT

## 2022-01-01 PROCEDURE — 0202U NFCT DS 22 TRGT SARS-COV-2: CPT | Performed by: INTERNAL MEDICINE

## 2022-01-01 PROCEDURE — 84156 ASSAY OF PROTEIN URINE: CPT | Performed by: INTERNAL MEDICINE

## 2022-01-01 PROCEDURE — 80061 LIPID PANEL: CPT | Performed by: INTERNAL MEDICINE

## 2022-01-01 PROCEDURE — 84132 ASSAY OF SERUM POTASSIUM: CPT | Performed by: INTERNAL MEDICINE

## 2022-01-01 PROCEDURE — 87999 UNLISTED MICROBIOLOGY PX: CPT

## 2022-01-01 PROCEDURE — 97164 PT RE-EVAL EST PLAN CARE: CPT

## 2022-01-01 PROCEDURE — 87086 URINE CULTURE/COLONY COUNT: CPT | Performed by: INTERNAL MEDICINE

## 2022-01-01 PROCEDURE — 82570 ASSAY OF URINE CREATININE: CPT | Performed by: INTERNAL MEDICINE

## 2022-01-01 PROCEDURE — 31500 INSERT EMERGENCY AIRWAY: CPT

## 2022-01-01 PROCEDURE — 82140 ASSAY OF AMMONIA: CPT | Performed by: NURSE PRACTITIONER

## 2022-01-01 PROCEDURE — 82803 BLOOD GASES ANY COMBINATION: CPT | Performed by: INTERNAL MEDICINE

## 2022-01-01 PROCEDURE — 83050 HGB METHEMOGLOBIN QUAN: CPT | Performed by: NURSE PRACTITIONER

## 2022-01-01 PROCEDURE — 85610 PROTHROMBIN TIME: CPT | Performed by: NURSE PRACTITIONER

## 2022-01-01 PROCEDURE — 83521 IG LIGHT CHAINS FREE EACH: CPT | Performed by: INTERNAL MEDICINE

## 2022-01-01 PROCEDURE — 36600 WITHDRAWAL OF ARTERIAL BLOOD: CPT | Performed by: INTERNAL MEDICINE

## 2022-01-01 PROCEDURE — 85007 BL SMEAR W/DIFF WBC COUNT: CPT | Performed by: NURSE PRACTITIONER

## 2022-01-01 PROCEDURE — 87205 SMEAR GRAM STAIN: CPT | Performed by: NURSE PRACTITIONER

## 2022-01-01 PROCEDURE — 94150 VITAL CAPACITY TEST: CPT

## 2022-01-01 PROCEDURE — 5A09357 ASSISTANCE WITH RESPIRATORY VENTILATION, LESS THAN 24 CONSECUTIVE HOURS, CONTINUOUS POSITIVE AIRWAY PRESSURE: ICD-10-PCS | Performed by: INTERNAL MEDICINE

## 2022-01-01 PROCEDURE — 83735 ASSAY OF MAGNESIUM: CPT | Performed by: NURSE PRACTITIONER

## 2022-01-01 PROCEDURE — 71045 X-RAY EXAM CHEST 1 VIEW: CPT | Performed by: PHYSICIAN ASSISTANT

## 2022-01-01 PROCEDURE — 81001 URINALYSIS AUTO W/SCOPE: CPT | Performed by: NURSE PRACTITIONER

## 2022-01-01 PROCEDURE — 4350F CNSLNG PROVIDED SYMP MNGMNT: CPT | Performed by: INTERNAL MEDICINE

## 2022-01-01 PROCEDURE — 93010 ELECTROCARDIOGRAM REPORT: CPT | Performed by: INTERNAL MEDICINE

## 2022-01-01 PROCEDURE — 97112 NEUROMUSCULAR REEDUCATION: CPT

## 2022-01-01 PROCEDURE — 5A1935Z RESPIRATORY VENTILATION, LESS THAN 24 CONSECUTIVE HOURS: ICD-10-PCS | Performed by: INTERNAL MEDICINE

## 2022-01-01 PROCEDURE — 94003 VENT MGMT INPAT SUBQ DAY: CPT

## 2022-01-01 PROCEDURE — 71045 X-RAY EXAM CHEST 1 VIEW: CPT | Performed by: HOSPITALIST

## 2022-01-01 PROCEDURE — 82330 ASSAY OF CALCIUM: CPT | Performed by: INTERNAL MEDICINE

## 2022-01-01 PROCEDURE — 93005 ELECTROCARDIOGRAM TRACING: CPT

## 2022-01-01 PROCEDURE — 94002 VENT MGMT INPAT INIT DAY: CPT

## 2022-01-01 PROCEDURE — 5A1945Z RESPIRATORY VENTILATION, 24-96 CONSECUTIVE HOURS: ICD-10-PCS | Performed by: NURSE PRACTITIONER

## 2022-01-01 PROCEDURE — 84165 PROTEIN E-PHORESIS SERUM: CPT | Performed by: INTERNAL MEDICINE

## 2022-01-01 PROCEDURE — 85049 AUTOMATED PLATELET COUNT: CPT

## 2022-01-01 PROCEDURE — 0BP1XDZ REMOVAL OF INTRALUMINAL DEVICE FROM TRACHEA, EXTERNAL APPROACH: ICD-10-PCS | Performed by: INTERNAL MEDICINE

## 2022-01-01 PROCEDURE — 85025 COMPLETE CBC W/AUTO DIFF WBC: CPT | Performed by: HOSPITALIST

## 2022-01-01 PROCEDURE — 84145 PROCALCITONIN (PCT): CPT | Performed by: INTERNAL MEDICINE

## 2022-01-01 PROCEDURE — 87070 CULTURE OTHR SPECIMN AEROBIC: CPT | Performed by: NURSE PRACTITIONER

## 2022-01-01 PROCEDURE — 80053 COMPREHEN METABOLIC PANEL: CPT | Performed by: INTERNAL MEDICINE

## 2022-01-01 PROCEDURE — 84100 ASSAY OF PHOSPHORUS: CPT | Performed by: INTERNAL MEDICINE

## 2022-01-01 PROCEDURE — 85007 BL SMEAR W/DIFF WBC COUNT: CPT | Performed by: INTERNAL MEDICINE

## 2022-01-01 PROCEDURE — 70450 CT HEAD/BRAIN W/O DYE: CPT | Performed by: INTERNAL MEDICINE

## 2022-01-01 PROCEDURE — 97168 OT RE-EVAL EST PLAN CARE: CPT

## 2022-01-01 PROCEDURE — 83605 ASSAY OF LACTIC ACID: CPT | Performed by: NURSE PRACTITIONER

## 2022-01-01 PROCEDURE — 84300 ASSAY OF URINE SODIUM: CPT | Performed by: INTERNAL MEDICINE

## 2022-01-01 PROCEDURE — 93308 TTE F-UP OR LMTD: CPT | Performed by: INTERNAL MEDICINE

## 2022-01-01 PROCEDURE — 82375 ASSAY CARBOXYHB QUANT: CPT | Performed by: NURSE PRACTITIONER

## 2022-01-01 PROCEDURE — 36600 WITHDRAWAL OF ARTERIAL BLOOD: CPT | Performed by: NURSE PRACTITIONER

## 2022-01-01 PROCEDURE — 3E033XZ INTRODUCTION OF VASOPRESSOR INTO PERIPHERAL VEIN, PERCUTANEOUS APPROACH: ICD-10-PCS | Performed by: HOSPITALIST

## 2022-01-01 PROCEDURE — 82330 ASSAY OF CALCIUM: CPT | Performed by: NURSE PRACTITIONER

## 2022-01-01 PROCEDURE — 84295 ASSAY OF SERUM SODIUM: CPT | Performed by: NURSE PRACTITIONER

## 2022-01-01 PROCEDURE — 5A12012 PERFORMANCE OF CARDIAC OUTPUT, SINGLE, MANUAL: ICD-10-PCS | Performed by: HOSPITALIST

## 2022-01-01 PROCEDURE — 70551 MRI BRAIN STEM W/O DYE: CPT | Performed by: HOSPITALIST

## 2022-01-01 PROCEDURE — S0028 INJECTION, FAMOTIDINE, 20 MG: HCPCS | Performed by: NURSE PRACTITIONER

## 2022-01-01 PROCEDURE — 97166 OT EVAL MOD COMPLEX 45 MIN: CPT

## 2022-01-01 PROCEDURE — 85018 HEMOGLOBIN: CPT | Performed by: NURSE PRACTITIONER

## 2022-01-01 PROCEDURE — 71250 CT THORAX DX C-: CPT | Performed by: INTERNAL MEDICINE

## 2022-01-01 PROCEDURE — 97163 PT EVAL HIGH COMPLEX 45 MIN: CPT

## 2022-01-01 PROCEDURE — 83540 ASSAY OF IRON: CPT | Performed by: INTERNAL MEDICINE

## 2022-01-01 PROCEDURE — 85730 THROMBOPLASTIN TIME PARTIAL: CPT | Performed by: NURSE PRACTITIONER

## 2022-01-01 PROCEDURE — 94664 DEMO&/EVAL PT USE INHALER: CPT

## 2022-01-01 PROCEDURE — 82043 UR ALBUMIN QUANTITATIVE: CPT | Performed by: INTERNAL MEDICINE

## 2022-01-01 PROCEDURE — 82728 ASSAY OF FERRITIN: CPT | Performed by: INTERNAL MEDICINE

## 2022-01-01 PROCEDURE — 83735 ASSAY OF MAGNESIUM: CPT | Performed by: HOSPITALIST

## 2022-01-01 RX ORDER — MORPHINE SULFATE 2 MG/ML
2 INJECTION, SOLUTION INTRAMUSCULAR; INTRAVENOUS EVERY 2 HOUR PRN
Status: DISCONTINUED | OUTPATIENT
Start: 2022-01-01 | End: 2022-01-01

## 2022-01-01 RX ORDER — FUROSEMIDE 10 MG/ML
40 INJECTION INTRAMUSCULAR; INTRAVENOUS ONCE
Status: COMPLETED | OUTPATIENT
Start: 2022-01-01 | End: 2022-01-01

## 2022-01-01 RX ORDER — FUROSEMIDE 10 MG/ML
20 INJECTION INTRAMUSCULAR; INTRAVENOUS ONCE
Status: COMPLETED | OUTPATIENT
Start: 2022-01-01 | End: 2022-01-01

## 2022-01-01 RX ORDER — ACETAMINOPHEN 325 MG/1
650 TABLET ORAL EVERY 4 HOURS PRN
Status: DISCONTINUED | OUTPATIENT
Start: 2022-01-01 | End: 2022-01-01

## 2022-01-01 RX ORDER — METOCLOPRAMIDE HYDROCHLORIDE 5 MG/ML
10 INJECTION INTRAMUSCULAR; INTRAVENOUS ONCE
Status: COMPLETED | OUTPATIENT
Start: 2022-01-01 | End: 2022-01-01

## 2022-01-01 RX ORDER — ASPIRIN 300 MG/1
300 SUPPOSITORY RECTAL DAILY
Status: DISCONTINUED | OUTPATIENT
Start: 2022-01-01 | End: 2022-01-01

## 2022-01-01 RX ORDER — HEPARIN SODIUM 1000 [USP'U]/ML
30 INJECTION, SOLUTION INTRAVENOUS; SUBCUTANEOUS ONCE
Status: COMPLETED | OUTPATIENT
Start: 2022-01-01 | End: 2022-01-01

## 2022-01-01 RX ORDER — HEPARIN SODIUM AND DEXTROSE 10000; 5 [USP'U]/100ML; G/100ML
INJECTION INTRAVENOUS CONTINUOUS
Status: DISCONTINUED | OUTPATIENT
Start: 2022-01-01 | End: 2022-01-01

## 2022-01-01 RX ORDER — DEXMEDETOMIDINE HYDROCHLORIDE 4 UG/ML
INJECTION, SOLUTION INTRAVENOUS CONTINUOUS
Status: DISCONTINUED | OUTPATIENT
Start: 2022-01-01 | End: 2022-01-01

## 2022-01-01 RX ORDER — POTASSIUM CHLORIDE 1.5 G/1.77G
20 POWDER, FOR SOLUTION ORAL ONCE
Status: COMPLETED | OUTPATIENT
Start: 2022-01-01 | End: 2022-01-01

## 2022-01-01 RX ORDER — ATORVASTATIN CALCIUM 10 MG/1
10 TABLET, FILM COATED ORAL NIGHTLY
Status: DISCONTINUED | OUTPATIENT
Start: 2022-01-01 | End: 2022-01-01

## 2022-01-01 RX ORDER — IPRATROPIUM BROMIDE AND ALBUTEROL SULFATE 2.5; .5 MG/3ML; MG/3ML
3 SOLUTION RESPIRATORY (INHALATION)
Status: DISCONTINUED | OUTPATIENT
Start: 2022-01-01 | End: 2022-01-01

## 2022-01-01 RX ORDER — NICOTINE POLACRILEX 4 MG
15 LOZENGE BUCCAL
Status: DISCONTINUED | OUTPATIENT
Start: 2022-01-01 | End: 2022-01-01

## 2022-01-01 RX ORDER — IPRATROPIUM BROMIDE AND ALBUTEROL SULFATE 2.5; .5 MG/3ML; MG/3ML
3 SOLUTION RESPIRATORY (INHALATION) EVERY 4 HOURS PRN
Status: DISCONTINUED | OUTPATIENT
Start: 2022-01-01 | End: 2022-01-01

## 2022-01-01 RX ORDER — SODIUM CHLORIDE 450 MG/100ML
INJECTION, SOLUTION INTRAVENOUS CONTINUOUS
Status: DISCONTINUED | OUTPATIENT
Start: 2022-01-01 | End: 2022-01-01

## 2022-01-01 RX ORDER — ALBUTEROL SULFATE 2.5 MG/3ML
2.5 SOLUTION RESPIRATORY (INHALATION) EVERY 6 HOURS PRN
Status: DISCONTINUED | OUTPATIENT
Start: 2022-01-01 | End: 2022-01-01

## 2022-01-01 RX ORDER — ENOXAPARIN SODIUM 100 MG/ML
30 INJECTION SUBCUTANEOUS DAILY
Status: DISCONTINUED | OUTPATIENT
Start: 2022-01-01 | End: 2022-01-01

## 2022-01-01 RX ORDER — SODIUM CHLORIDE, SODIUM LACTATE, POTASSIUM CHLORIDE, CALCIUM CHLORIDE 600; 310; 30; 20 MG/100ML; MG/100ML; MG/100ML; MG/100ML
INJECTION, SOLUTION INTRAVENOUS CONTINUOUS
Status: DISCONTINUED | OUTPATIENT
Start: 2022-01-01 | End: 2022-01-01

## 2022-01-01 RX ORDER — ACETAMINOPHEN 10 MG/ML
1000 INJECTION, SOLUTION INTRAVENOUS EVERY 6 HOURS PRN
Status: DISCONTINUED | OUTPATIENT
Start: 2022-01-01 | End: 2022-01-01

## 2022-01-01 RX ORDER — HYDRALAZINE HYDROCHLORIDE 50 MG/1
100 TABLET, FILM COATED ORAL EVERY 8 HOURS SCHEDULED
Status: DISCONTINUED | OUTPATIENT
Start: 2022-01-01 | End: 2022-01-01

## 2022-01-01 RX ORDER — METOPROLOL TARTRATE 50 MG/1
50 TABLET, FILM COATED ORAL
Status: DISCONTINUED | OUTPATIENT
Start: 2022-01-01 | End: 2022-01-01

## 2022-01-01 RX ORDER — ENOXAPARIN SODIUM 100 MG/ML
40 INJECTION SUBCUTANEOUS DAILY
Status: DISCONTINUED | OUTPATIENT
Start: 2022-01-01 | End: 2022-01-01

## 2022-01-01 RX ORDER — ALBUMIN, HUMAN INJ 5% 5 %
250 SOLUTION INTRAVENOUS ONCE
Status: COMPLETED | OUTPATIENT
Start: 2022-01-01 | End: 2022-01-01

## 2022-01-01 RX ORDER — METOCLOPRAMIDE HYDROCHLORIDE 5 MG/ML
5 INJECTION INTRAMUSCULAR; INTRAVENOUS EVERY 8 HOURS PRN
Status: DISCONTINUED | OUTPATIENT
Start: 2022-01-01 | End: 2022-01-01

## 2022-01-01 RX ORDER — ONDANSETRON 2 MG/ML
4 INJECTION INTRAMUSCULAR; INTRAVENOUS EVERY 6 HOURS PRN
Status: DISCONTINUED | OUTPATIENT
Start: 2022-01-01 | End: 2022-01-01

## 2022-01-01 RX ORDER — ALBUTEROL SULFATE 2.5 MG/3ML
SOLUTION RESPIRATORY (INHALATION)
Status: DISPENSED
Start: 2022-01-01 | End: 2022-01-01

## 2022-01-01 RX ORDER — CLOPIDOGREL BISULFATE 75 MG/1
75 TABLET ORAL DAILY
Status: DISCONTINUED | OUTPATIENT
Start: 2022-01-01 | End: 2022-01-01

## 2022-01-01 RX ORDER — SODIUM BICARBONATE 325 MG/1
325 TABLET ORAL AS NEEDED
Status: DISCONTINUED | OUTPATIENT
Start: 2022-01-01 | End: 2022-01-01

## 2022-01-01 RX ORDER — SENNOSIDES 8.8 MG/5ML
10 LIQUID ORAL NIGHTLY PRN
Status: DISCONTINUED | OUTPATIENT
Start: 2022-01-01 | End: 2022-01-01

## 2022-01-01 RX ORDER — ACETAMINOPHEN 650 MG/1
650 SUPPOSITORY RECTAL EVERY 4 HOURS PRN
Status: DISCONTINUED | OUTPATIENT
Start: 2022-01-01 | End: 2022-01-01

## 2022-01-01 RX ORDER — MELATONIN
3 NIGHTLY PRN
Status: DISCONTINUED | OUTPATIENT
Start: 2022-01-01 | End: 2022-01-01

## 2022-01-01 RX ORDER — ASPIRIN 81 MG/1
81 TABLET, CHEWABLE ORAL DAILY
Status: DISCONTINUED | OUTPATIENT
Start: 2022-01-01 | End: 2022-01-01

## 2022-01-01 RX ORDER — ACETAMINOPHEN 160 MG/5ML
650 SOLUTION ORAL EVERY 4 HOURS PRN
Status: DISCONTINUED | OUTPATIENT
Start: 2022-01-01 | End: 2022-01-01

## 2022-01-01 RX ORDER — CARVEDILOL 12.5 MG/1
25 TABLET ORAL 2 TIMES DAILY WITH MEALS
Status: DISCONTINUED | OUTPATIENT
Start: 2022-01-01 | End: 2022-01-01

## 2022-01-01 RX ORDER — METOPROLOL TARTRATE 5 MG/5ML
5 INJECTION INTRAVENOUS EVERY 4 HOURS
Status: DISCONTINUED | OUTPATIENT
Start: 2022-01-01 | End: 2022-01-01

## 2022-01-01 RX ORDER — LABETALOL HYDROCHLORIDE 5 MG/ML
10 INJECTION, SOLUTION INTRAVENOUS EVERY 4 HOURS PRN
Status: DISCONTINUED | OUTPATIENT
Start: 2022-01-01 | End: 2022-01-01

## 2022-01-01 RX ORDER — IPRATROPIUM BROMIDE AND ALBUTEROL SULFATE 2.5; .5 MG/3ML; MG/3ML
SOLUTION RESPIRATORY (INHALATION)
Status: COMPLETED
Start: 2022-01-01 | End: 2022-01-01

## 2022-01-01 RX ORDER — CARVEDILOL 12.5 MG/1
12.5 TABLET ORAL ONCE
Status: COMPLETED | OUTPATIENT
Start: 2022-01-01 | End: 2022-01-01

## 2022-01-01 RX ORDER — METOPROLOL TARTRATE 5 MG/5ML
5 INJECTION INTRAVENOUS EVERY 6 HOURS
Status: DISCONTINUED | OUTPATIENT
Start: 2022-01-01 | End: 2022-01-01

## 2022-01-01 RX ORDER — FAMOTIDINE 10 MG/ML
20 INJECTION, SOLUTION INTRAVENOUS 2 TIMES DAILY
Status: DISCONTINUED | OUTPATIENT
Start: 2022-01-01 | End: 2022-01-01

## 2022-01-01 RX ORDER — CARVEDILOL 12.5 MG/1
12.5 TABLET ORAL 2 TIMES DAILY WITH MEALS
Status: DISCONTINUED | OUTPATIENT
Start: 2022-01-01 | End: 2022-01-01

## 2022-01-01 RX ORDER — HEPARIN SODIUM 5000 [USP'U]/ML
5000 INJECTION, SOLUTION INTRAVENOUS; SUBCUTANEOUS EVERY 8 HOURS SCHEDULED
Status: DISCONTINUED | OUTPATIENT
Start: 2022-01-01 | End: 2022-01-01

## 2022-01-01 RX ORDER — CHLORHEXIDINE GLUCONATE 0.12 MG/ML
15 RINSE ORAL
Status: DISCONTINUED | OUTPATIENT
Start: 2022-01-01 | End: 2022-01-01 | Stop reason: ALTCHOICE

## 2022-01-01 RX ORDER — HYDRALAZINE HYDROCHLORIDE 20 MG/ML
10 INJECTION INTRAMUSCULAR; INTRAVENOUS EVERY 4 HOURS PRN
Status: DISCONTINUED | OUTPATIENT
Start: 2022-01-01 | End: 2022-01-01

## 2022-01-01 RX ORDER — ASPIRIN 325 MG
325 TABLET ORAL DAILY
Status: DISCONTINUED | OUTPATIENT
Start: 2022-01-01 | End: 2022-01-01

## 2022-01-01 RX ORDER — SODIUM CHLORIDE 9 MG/ML
INJECTION, SOLUTION INTRAVENOUS CONTINUOUS
Status: DISCONTINUED | OUTPATIENT
Start: 2022-01-01 | End: 2022-01-01

## 2022-01-01 RX ORDER — POLYETHYLENE GLYCOL 3350 17 G/17G
17 POWDER, FOR SOLUTION ORAL DAILY PRN
Status: DISCONTINUED | OUTPATIENT
Start: 2022-01-01 | End: 2022-01-01

## 2022-01-01 RX ORDER — POTASSIUM CHLORIDE 29.8 MG/ML
40 INJECTION INTRAVENOUS ONCE
Status: COMPLETED | OUTPATIENT
Start: 2022-01-01 | End: 2022-01-01

## 2022-01-01 RX ORDER — ACETYLCYSTEINE 200 MG/ML
3 SOLUTION ORAL; RESPIRATORY (INHALATION) 3 TIMES DAILY
Status: DISCONTINUED | OUTPATIENT
Start: 2022-01-01 | End: 2022-01-01

## 2022-01-01 RX ORDER — SODIUM PHOSPHATE, DIBASIC AND SODIUM PHOSPHATE, MONOBASIC 7; 19 G/133ML; G/133ML
1 ENEMA RECTAL ONCE AS NEEDED
Status: DISCONTINUED | OUTPATIENT
Start: 2022-01-01 | End: 2022-01-01

## 2022-01-01 RX ORDER — TERAZOSIN 2 MG/1
2 CAPSULE ORAL 2 TIMES DAILY
Status: DISCONTINUED | OUTPATIENT
Start: 2022-01-01 | End: 2022-01-01

## 2022-01-01 RX ORDER — HYDROCODONE BITARTRATE AND ACETAMINOPHEN 5; 325 MG/1; MG/1
1 TABLET ORAL EVERY 6 HOURS PRN
Status: DISCONTINUED | OUTPATIENT
Start: 2022-01-01 | End: 2022-01-01

## 2022-01-01 RX ORDER — DEXTROSE MONOHYDRATE 25 G/50ML
50 INJECTION, SOLUTION INTRAVENOUS
Status: DISCONTINUED | OUTPATIENT
Start: 2022-01-01 | End: 2022-01-01

## 2022-01-01 RX ORDER — FAMOTIDINE 20 MG/1
20 TABLET, FILM COATED ORAL DAILY
Status: DISCONTINUED | OUTPATIENT
Start: 2022-01-01 | End: 2022-01-01 | Stop reason: ALTCHOICE

## 2022-01-01 RX ORDER — LABETALOL HYDROCHLORIDE 5 MG/ML
10 INJECTION, SOLUTION INTRAVENOUS EVERY 10 MIN PRN
Status: COMPLETED | OUTPATIENT
Start: 2022-01-01 | End: 2022-01-01

## 2022-01-01 RX ORDER — DEXAMETHASONE SODIUM PHOSPHATE 4 MG/ML
10 VIAL (ML) INJECTION EVERY 24 HOURS
Status: DISCONTINUED | OUTPATIENT
Start: 2022-01-01 | End: 2022-01-01

## 2022-01-01 RX ORDER — HYDRALAZINE HYDROCHLORIDE 20 MG/ML
10 INJECTION INTRAMUSCULAR; INTRAVENOUS EVERY 2 HOUR PRN
Status: DISCONTINUED | OUTPATIENT
Start: 2022-01-01 | End: 2022-01-01

## 2022-01-01 RX ORDER — DEXTROSE MONOHYDRATE 50 MG/ML
INJECTION, SOLUTION INTRAVENOUS CONTINUOUS
Status: DISCONTINUED | OUTPATIENT
Start: 2022-01-01 | End: 2022-01-01

## 2022-01-01 RX ORDER — NICOTINE POLACRILEX 4 MG
30 LOZENGE BUCCAL
Status: DISCONTINUED | OUTPATIENT
Start: 2022-01-01 | End: 2022-01-01

## 2022-01-01 RX ORDER — POTASSIUM CHLORIDE 20 MEQ/1
20 TABLET, EXTENDED RELEASE ORAL ONCE
Status: DISCONTINUED | OUTPATIENT
Start: 2022-01-01 | End: 2022-01-01

## 2022-01-01 RX ORDER — IPRATROPIUM BROMIDE AND ALBUTEROL SULFATE 2.5; .5 MG/3ML; MG/3ML
3 SOLUTION RESPIRATORY (INHALATION) EVERY 6 HOURS SCHEDULED
Status: DISCONTINUED | OUTPATIENT
Start: 2022-01-01 | End: 2022-01-01

## 2022-01-01 RX ORDER — BISACODYL 10 MG
10 SUPPOSITORY, RECTAL RECTAL
Status: DISCONTINUED | OUTPATIENT
Start: 2022-01-01 | End: 2022-01-01

## 2022-01-01 RX ORDER — HEPARIN SODIUM 1000 [USP'U]/ML
3000 INJECTION, SOLUTION INTRAVENOUS; SUBCUTANEOUS ONCE
Status: COMPLETED | OUTPATIENT
Start: 2022-01-01 | End: 2022-01-01

## 2022-01-01 RX ORDER — HEPARIN SODIUM AND DEXTROSE 10000; 5 [USP'U]/100ML; G/100ML
1000 INJECTION INTRAVENOUS ONCE
Status: COMPLETED | OUTPATIENT
Start: 2022-01-01 | End: 2022-01-01

## 2022-01-24 PROBLEM — S46.811D PARTIAL TEAR OF RIGHT SUBSCAPULARIS TENDON, SUBSEQUENT ENCOUNTER: Status: RESOLVED | Noted: 2021-12-13 | Resolved: 2022-01-24

## 2022-01-24 PROBLEM — M54.16 LEFT LUMBAR RADICULOPATHY: Status: RESOLVED | Noted: 2021-12-13 | Resolved: 2022-01-24

## 2022-01-24 PROBLEM — S46.119A SUBLUXATION OF TENDON OF LONG HEAD OF BICEPS: Status: RESOLVED | Noted: 2021-12-13 | Resolved: 2022-01-24

## 2022-01-24 PROBLEM — S46.811D PARTIAL TEAR OF RIGHT SUBSCAPULARIS TENDON, SUBSEQUENT ENCOUNTER: Status: RESOLVED | Noted: 2021-01-01 | Resolved: 2022-01-01

## 2022-01-24 PROBLEM — M54.16 LEFT LUMBAR RADICULOPATHY: Status: RESOLVED | Noted: 2021-01-01 | Resolved: 2022-01-01

## 2022-01-24 PROBLEM — S43.003A SUBLUXATION OF TENDON OF LONG HEAD OF BICEPS: Status: RESOLVED | Noted: 2021-01-01 | Resolved: 2022-01-01

## 2022-06-03 NOTE — PROGRESS NOTES
[facility] Cardiology Progress Note - Keri Ingram Patient Status:  Inpatient    1955 MRN [id number]   [facility] 5NW-A Attending Milton Berry MD   [facility] Day # 2 PCP Maggy Grissom DO     Subjective:  Pa Pt ambulatory with steady gait to the bathroom for UA   Consolidation of left lower lobe of lung (HCC)     Hypoxia     Atrial fibrillation with rapid ventricular response (HCC)     Elevated troponin     Acute renal insufficiency     Tobacco abuse     Pneumonia of left upper lobe due to infectious organism ([facility] [address] 9.1 9.2 8.9   MG  --   --  2.9*   PHOS  --   --  4.8   * 233* 285*       Recent Labs   Lab 08/01/19 2030   ALT 43   AST 73*   ALB 2.6*       Recent Labs   Lab 08/01/19 2030 08/02/19  0453   TROP 0.102* 0.105*       Allergies:   No Known Allergies otherwise feels well, weight stable  Constitutiona: no recent fevers  Skin: denies any unusual skin lesions or rashes  Eyes: no visual complaints or deficits  HEENT: denies nasal congestion, sinus pain; hearing loss negative  Respiratory: denies shortness

## 2022-08-09 NOTE — CM/SW NOTE
[place] ICU to [facility] ICU    Diagnosis: Respiratory Failure, DKA   Accepting MD:  Ayaka Steve                  Accepting Specialist(s):  [address]: Damion Webb date from [address]:  COVID results: Negative  Insurance & Verification: yes  Reason for Transfer:  Doctors  are here  Bed Requested: yes  Inpatient vs. Observation status: INPT  Transportation:  Ambulance, family aware of payment. 5:30 PM:  Pt has been accepted to [facility] ICU by Merrill Benson and Damaris. Patient's significant other, Jesica Pretty is running this by his sons because there is a transportation cost.  Per Radha Rodrigez, ICU nurse félix Beltran, transportation cost is $7,300.     7:00 PM:  Damian Arthur back from Radha Rodrigez, [address] ICU. Patient's family is willing to pay the ambulance fee. Pt to go to room 460 and [place] called to [phone]. Radha Rodrigez RN aware. [place] ICU number is [phone].    7:25 PM:  [facility] and spoke to [address] to let her know the family is paying the ambulance fee and will be coming over to the ICU. Pt is now going to 456.

## 2022-08-10 PROBLEM — N17.9 AKI (ACUTE KIDNEY INJURY) (HCC): Status: ACTIVE | Noted: 2019-08-01

## 2022-08-10 PROBLEM — J96.91 RESPIRATORY FAILURE WITH HYPOXIA (HCC): Status: ACTIVE | Noted: 2022-01-01

## 2022-08-10 NOTE — PROGRESS NOTES
[facility]acy Note: Renal dose adjustment for Famotidine (Pepcid)  Leeanna Harris has been prescribed Famotidine (Pepcid) 20 mg every 12 hours. Estimated Creatinine Clearance: 21.1 mL/min (A) (based on SCr of 3.38 mg/dL (H)). His calculated creatinine clearance is less than 50 ml/min, therefore, the dose of Famotidine (Pepcid) has been decreased to 20 mg once daily per protocol.     Thank you,   Liberty Zimmerman, PharmD  8/10/2022,  2:16 PM

## 2022-08-10 NOTE — SLP NOTE
Order received, note patient orally intubated and not appropriate for po. Will hold and follow peripherally, completing evaluation as appropriate.     Priyanka Bryant John 87 CCC-SLP  Pager [phone]

## 2022-08-10 NOTE — PLAN OF CARE
Assumed care of patient this am. Sanchez catheter removed per protocol. Female external placed. Bladder scanned/straight cathed x1. CT Chest reviewed with MD. SBT X1 hour per MD - repeat ABG reviewed, MD wanting only 1 hour SBT today. Cardiology consult placed. Tube feedings started. Wife at bedside updated on plan of care.

## 2022-08-10 NOTE — PLAN OF CARE
Pt arrived via EMS alert to self able to follow commands. Ventilated tolerating current settings. RASS at -2. Precedex on for ventilator compliance. Subglottic Tube connected to LIS. Lung sounds rhonchi/diminished. Afib/Flutter 50-70's frequent PVCs. OG connected to LIS. Sanchez intact. Vital signs stable. See flowsheets/MAR for clinical correlation.

## 2022-08-11 NOTE — PROGRESS NOTES
Pharmacy Note:  Discontinuation of Stress Ulcer Prophylaxis       Nikko Mcbride is a 73year old male who was initiated on famotidine for stress ulcer prophylaxis. The patient no longer meets criteria for stress ulcer prophylaxis. It has been discontinued per pharmacy protocol.         Thank you,   Erika Luong, PharmD, BCPS  8/11/2022  3:49 PM

## 2022-08-11 NOTE — PLAN OF CARE
Assumed care of pt during the nightshift. Pt remains intubated and sedated on precedex gtt. Hydralazine IVP given x1 for HTN. See flowsheets for further assessment.

## 2022-08-11 NOTE — PLAN OF CARE
Pt received after change of shift report. Pt received intubated and sedated. Pt received sedated on precedex. Precedex titrated off (see flowsheets). Pt following commands. SAT performed and passed. SBT preformed and passed. Pt extubated at 1110. Pt alert and oriented x3. Following commands. Pupils equal and reactive. Reported pain. PRN tylenol given. Pt extubated to 2L nasal cannula. Oxygen saturation stable. Afebrile. Blood pressure elevated. Scheduled medications given (see MAR). Atrial fibrillation on telemetry strip. OG tube removed with extubation. Dobhoff placed per order. Chest X-Ray done. APN notified about misplacement. Dobhoff replaced. Chest X-Ray done. Tube feedings started. Accuchecks Q6. Hypoglycemic. D50 given. APN notified. No BM's. External catheter in place. Family at bedside and updated on pt status and plan of care.

## 2022-08-12 NOTE — PLAN OF CARE
Received pt confused on 3L NC. Oriented to self and place, disoriented to situation and time. Reorientation provided. Weaning O2 as tolerated. C/o sacral pain; PRN tylenol given without relief. Orders received for PRN norco. Dobhoff intact. TF at goal, tolerating. Multiple incontinent urine episodes. Afebrile. VSS. Afib on monitor. Family at bedside and updated on plan of care. See flowsheets. See MAR. Has transfer orders. Will continue to monitor.

## 2022-08-13 NOTE — SLP NOTE
SPEECH DAILY NOTE - INPATIENT    ASSESSMENT & PLAN   ASSESSMENT  Pt seen for monitor of diet consistency tolerance. Pt transferred out of ICU and currently on 5th floor. Per the pt's wife the pt is exhausted. Pt able to arouse by name, but difficulty staying awake. Pt given po trials of puree and mildly thick liquids by tsp. Pt with good acceptance, containment with all consistencies. Oral transit time (SEKOU) increased with puree and nectar, swallow triggered and laryngeal elevation strong when palpated. No s/s of aspiration with both consistencies. Pt not appropriate for upgrade due to mentation and overall lethargy. D/w pt's RN and wife who demonstrated and expressed understanding. Speech to continue to monitor pt and attempt upgrade of consistencies. Diet Recommendations - Solids: Puree  Diet Recommendations - Liquids: Nectar thick liquids/ Mildly thick    Compensatory Strategies Recommended: Liquids via spoon; Slow rate;Small bites and sips  Aspiration Precautions: Upright position; Slow rate;Small bites and sips; No straw  Medication Administration Recommendations: One pill at a time; Whole in puree                     Discharge Recommendations  Discharge Recommendations/Plan: Undetermined    Treatment Plan  Treatment Plan/Recommendations: Dysphagia therapy    Interdisciplinary Communication: Discussed with RN            GOALS  Goal #1 The patient will tolerate puree consistency and mildly thick liquids without overt signs or symptoms of aspiration with 90 % accuracy over 2-3 session(s). In Progress   Goal #2 The patient will tolerate trial upgrade of ground/chopped/soft consistency and thin liquids without overt signs or symptoms of aspiration with 90 % accuracy over 2-3 session(s).     In Progress   Goal #3 Assess need and readiness for VFSS within 1-3 visits  In Progress     FOLLOW UP  Follow Up Needed (Documentation Required): Yes  SLP Follow-up Date: 08/15/22  Number of Visits to Meet Established Goals: 5        If you have any questions, please contact Koko De La Garza, SLP

## 2022-08-13 NOTE — PLAN OF CARE
Report received from previous RN. Pt oriented to name only slight confused at the beginning of shift but more confuse at night time. Left arm swollen and weak able to follow commands pupils equal and reactive. Pt has not slept during the day or night. Pt pulling electrodes off. Pt also pulled is dobhoff TF. Mittens in placed bed alarm in placed. Notified Ivory Null of event. Incontinent of urine. Acc q 6 and able to eat nectar thick food. PRN hydralazine given for SBP > 160 42 beat run of VT converted on his own after ask to cough. Electrolytes are normal.  Updated Lexus APN and Coreg was given and increased. dose for am

## 2022-08-13 NOTE — PLAN OF CARE
Oriented to self and place. Confused, frequently repeating himself. Mitts to protect monitoring/PICC. 2L NC. Afebrile. Afib w frequent PVCs. Improved with carvedilol. External catheter in place. Distended abdomen, no BM. Tolerating puree diet. C/o back pain. PRN norco. Report called to PMU.

## 2022-08-14 NOTE — PLAN OF CARE
Patient is oriented to person and place. He is drowsy. Able to take medications crushed in apple sauce. Vital signs are stable. Remains on 2L NC. Diminished lung sounds. Edema to bilateral arms and legs. Restraints removed tonight. Incontinent of bowel and urine. Plan for patient to continue IV abx.

## 2022-08-15 NOTE — PROGRESS NOTES
Assumed care at   Patient respiratory distress with increased work of breathing. bipap placed on. HR remains 's with frequent PVCs  Blood pressure remains elevated goal less than 220  neuro every two hours. Echo with bubbles ordered with patient more stable. Possible tomorrow morning  Heparin gtt infusing  D5 for NA level of 150  Elevated creat/ bun  Left arm precautions  IV merrem started and IV lasix given  Family updated by physician  HOB 30 degrees      1820  Patient not improving. Continues to have elevated respiratory rate. Multiple pvcs, not as responsive. Dr. Magdiel Ellsworth notified. Transfer to ICU. No beds in ICU per house supervisor. Transfer to CCU    1845  Partner jostin notified of transfer  ptt critical 240. Redraw to verify ordered.

## 2022-08-15 NOTE — PHYSICAL THERAPY NOTE
PT re-eval order received for MRI showing CVA. Discussed case with RN, pt not appropriate at this time. Will re-attempt as appropriate.

## 2022-08-15 NOTE — PROGRESS NOTES
08/14/22 2053   Provider Notification   Reason for Communication Evaluate   Provider Name Other (comment)  (christina serrano)   Method of Communication Page   Response Waiting for response   Notification Time 2054       Pt had new onset of left sided weakness today. CT brain(-). ABGs (-). Pt was supposed to go for MRI of brain earlier but couldn't tolerate due to work of breathing while lying flat. Pt received IV lasix since and was just lying flat for 30 minutes with no issue. Reorder MRI? Currently on 4L nc. Per MD okay to reorder MRI routine, see orders.

## 2022-08-15 NOTE — CONSULTS
[facility]  Inpatient Wound Care Contact Note    Fabby Esquivel Patient Status:  Inpatient    1955 MRN [id number]   [facility] 7NE-A Attending Ralph Sanders MD   Hosp Day # 6 PCP Nathan Payan MD     Attempted to see patient for follow up wound care assessment/session. Patient is currently unavailable secondary to being transferred secondary to confusion. Thank you,    Josue Little.  Reginaldo Lindquist, PT, MPT  [address] [address] [facility] [address]  [place], [address]  [phone]

## 2022-08-16 NOTE — CM/SW NOTE
Care Progression Note:  Length of stay: 7  GMLOS:   Avoidable Delays: none  Code Status: FULL    Acute Medical Issue/Factors:   Acute resp failure, PNA, Pulm edema, encephalopathy, Acute CVA- watershed infarct       Discharge Barriers: Awaiting clinical improvement. On heparin gtt. NPO. Dubhoff for nutrition may be placed. Swallow eval to be done. Neuro checks in progress. Expected discharge date: TBD  Expected next site of care: TBD- PT=PMR, per PMR evaluation patient not medically stable to participate in acute inpatient rehabilitaion. [facility] Will need PASSR completed if PAUL is needed. / available for discharge planning.        Tony Pride, RN, BSN   [phone]

## 2022-08-16 NOTE — OCCUPATIONAL THERAPY NOTE
OT order received, chart reviewed, pt with new CVA found on MRI and new OT order placed, pt now with resp failure and on BiPAP, OT spoke with RN, pt not approp at this time but will follow up this PM or tomorrow as pt is approp.

## 2022-08-16 NOTE — PLAN OF CARE
Assumed pt care at 0730. Pt alert but confused and delayed. Oriented to person. Needs re-orientation. Follows commands. L sided weakness. See flowsheet. VSS. Afib, HR controlled. SBP maintained 100-180. ECHO done. Heparin gtt per protocol. Pt c/o generalized pain. Pain medication prn. Bipap prn. NPO. Pt incontinent. Purewick intact. IVF. Picc intact. Pt updated with poc. Plan for speech and PT/OT. Will continue to monitor.

## 2022-08-16 NOTE — PHYSICAL THERAPY NOTE
Attempted to see pt at this time for physical therapy re-evaluation. Pt transferred to CNICU on 8/15/22 with increased WOB/respiratory failure and waxing and waning of neuro symptoms. Pt remains on BIPAP at this time. Will hold therapy at this time, plan to initiate as clinically appropriate.

## 2022-08-16 NOTE — PROGRESS NOTES
Neuro/ICU APN, Yobany Fallon, notified of patient transfer to [facility] for increased O2 needs and waxing and waning neuro symptoms per telemetry RN.

## 2022-08-16 NOTE — SLP NOTE
Attempted to see for follow up however pt transferred to CNICU on 8/15/22 due to increased WOB/respiratory failure. Pt is currently on BiPAP and not appropriate for po trials at this time. Will continue to follow with on-going reassessment of swallow function when safe and appropriate. RN informed and agreed with plan.     Tex Richardson [place], [address] [facility]  Pager

## 2022-08-17 NOTE — SLP NOTE
SPEECH DAILY NOTE - INPATIENT    ASSESSMENT & PLAN   ASSESSMENT  Contacted patient at the bedside. RN approved visit and assisted in positioning patient upright an midline in bed. Patient awoke for this therapist and expressed an interest in eating and drinking. He had been asking to eat. Patient was assessed with mildly thick liquids and puree solids. He demonstrated increased oral transit times, decreased hyolaryngeal elevation and apparent delay in the initiation of the pharyngeal swallow. Patient with throat clearing and coughing following liquids. Patient with difficulty sustaining alertness therefore required frequent cues to stay on task. Patient not appropriate at this time for completion of assessment of swallowing function due to inattention and fluctuating level of alertness. Recommend continue NPO status and re-assess as patient with improved level of participation. Diet Recommendations - Solids: NPO  Diet Recommendations - Liquids: NPO    Compensatory Strategies Recommended:  (n/a)  Aspiration Precautions:  (n/a)  Medication Administration Recommendations: Non-oral    Patient Experiencing Pain:  (no pain behaviors noted)    Discharge Recommendations  Discharge Recommendations/Plan: Undetermined    Treatment Plan  Treatment Plan/Recommendations: SLP to reassess (Oral hygiene)    Interdisciplinary Communication: Discussed with RN            GOALS  Goal #1 Reassess swallow function as patient demonstrates improved alertness and medically appropriate.    To be completed                                                                      FOLLOW UP  Follow Up Needed (Documentation Required): No  SLP Follow-up Date: 08/18/22  Number of Visits to Meet Established Goals: 4    Session: 1 of 4    If you have any questions, please contact ROSALINA Pollard

## 2022-08-18 NOTE — SLP NOTE
SPEECH DAILY NOTE - INPATIENT    ASSESSMENT & PLAN   ASSESSMENT  Contacted patient at the bedside. RN approved visit and assisted in positioning patient upright an midline in bed. Patient awoke for this therapist and expressed an interest in drinking stating his mouth was dry. Patient was assessed with teaspoon presentations of thin liquids and mildly thick liquids and puree solids. He demonstrated increased oral transit times, decreased hyolaryngeal elevation and apparent delay in the initiation of the pharyngeal swallow. Patient with throat clearing and coughing following thin liquids. Patient with difficulty sustaining alertness therefore required frequent cues to stay on task. Patient is not appropriate for po at this time due to inattention and fluctuating level of alertness. Recommend continue NPO status and re-assess as patient with improved level of participation. Collaborated with RN who expressed an understanding and agreed. Diet Recommendations - Solids: NPO  Diet Recommendations - Liquids: NPO    Compensatory Strategies Recommended:  (n/a)  Aspiration Precautions:  (n/a)  Medication Administration Recommendations: Non-oral    Patient Experiencing Pain: No                Discharge Recommendations  Discharge Recommendations/Plan: Undetermined    Treatment Plan  Treatment Plan/Recommendations: SLP to reassess    Interdisciplinary Communication: Discussed with RN            GOALS  Goal #1 Patient will participate in a re-assessment of swallowing as patient with improved level of alertness.    In Progress     FOLLOW UP  Follow Up Needed (Documentation Required): Yes  SLP Follow-up Date: 08/19/22  Number of Visits to Meet Established Goals: 4    Session: 2 of 4    If you have any questions, please contact ROSALINA Rodriguez

## 2022-08-18 NOTE — PLAN OF CARE
Assumed patient care this morning. Level of consciousness waxes and wanes with being drowsy/lethargic to being alert, mostly confused. Follow commands most of the times. Generalized weakness, worse on left than right. Difficult for thorough neuro assessment as patient will not participate at times. B/p elevated, but still within parameters. Heparin gtt infusing. Npo, evaluated by speech therapy, remain npo. Dobhoff inserted and tube feeds started as ordered. Primofit external catheter in reinforced/changed multiple times today but difficult to get good seal due to anatomy, unable to keep strict output with this, attempted [place] external cath with no success staying in place. Significant other/son at bedside today, updated. Cleared to transfer to telemetry.

## 2022-08-19 NOTE — CM/SW NOTE
MSW spoke with the patient's life partner Celestineluann Koo and explained the recommendation for rehab. She is agreeable and is working along side the patient's son Chad Nunes to make decisions for the patient. PAUL list emailed to Safia Koo at [email or web address]     As of right now, there are only 2 accepting facilities. PASRR has been completed. Will need Evicore initiated when closer to dc.     Manjula Bowen LCSW

## 2022-08-19 NOTE — CM/SW NOTE
Angel Vega from [address] [place] [address] available @ [phone] for discharge planning.

## 2022-08-20 NOTE — SLP NOTE
Attempted to see patient for swallow re-evaluation, however, patient eyes close, would not arouse with verbal/tactile stimulation. Not appropriate for PO evaluation at this time and will defer until awake/alert. NGT present; continue for nutrition. hydration and med route. Kal Tucker.  TAWNY Malave., CCC-SLP

## 2022-08-23 NOTE — PLAN OF CARE
Received patient this morning alert and oriented x2  No complaints of pain  Dobhoff with tube feeds at goal with no residual  Bilateral wrist restraints to prevent patient from pulling dobhoff  unstageable to sacrum, dressing changed per [facility]  GI consulted for possible PEG placement,RINA Alcantar unsure if that is what patient would want. Requesting goals of care meeting with physicians. Case mgmt aware and working on it  All needs met  Will monitor.

## 2022-08-23 NOTE — PROGRESS NOTES
Brief GI Note    Please page with reconsult after goals of care discussion with POA if PEG wanted.     Angelina Merlos MD  [facility] GI  [phone]

## 2022-08-24 NOTE — PLAN OF CARE
Assumed care for pt at 23:30 on 8/23    A&Oxself, knows he's at a hospital. Responded to other questions with \"help\". SpO2 76% on 6L NC, increased to 8L HFNC. MD notified + CXR ordered resulting in pulmonary edema & increasing bilat pleural effusions. Same MD ordered 40 IV lasix. SpO2 down to 6L after neb. Other VSS on 6L HFNC. Crackles auscultated on L side. Dobhoff to L nare tape at 66 outside of nose. TF residual 240, held for 2 hrs. Bilateral soft wrist restraints d/t pulling out dobhoff 3 x. See Jennifer Glover for incontinence. Briefed, incontinent of bowel. Two blackened areas on buttocks. Cleansed, mepilex changed. Therapy on [place] bed. L arm PICC, unit draw. Heparin infusing afib protocol, bilat SCDs on. q6h accucheck. Strict NPO    Plan: AM labs. Palliative to see. Wean O2 if able. May get PEG. Bed alarm on, call light in reach. Rounding routinely.

## 2022-08-24 NOTE — PROGRESS NOTES
Restraints unhooked at 0900, will monitor patient off restraints. Patient more cooperative this AM, more alert, and pulling less at his attachments. Per CNAs patient did not attempt to pull at any medical devices nor did he have fleeting hands while they worked with him. This prompted RN to [employer] and dc restraints.

## 2022-08-24 NOTE — CM/SW NOTE
Received voicemail from [facility], Care Coordinator, offering discharge planning support.      Phone # [phone]

## 2022-08-24 NOTE — PLAN OF CARE
TF on hold since 00:30, residual check at 4am = 400cc. Notified Dr. Nuria Germain.  One time 10 IV reglan ordered, will keep TF on hold and recheck residual 06:30

## 2022-08-25 NOTE — PROGRESS NOTES
Pt seems more alert today. In 10 out of 10 pain. norco given x1 and morphine given x2. Residual at 9 was 60ml then I bumped up feeding to goal of 50mL. Restraints off since AM pt compliant. On Nepro tube feeding going through [facility].

## 2022-08-25 NOTE — CM/SW NOTE
Care Progression Note:  Length of stay: 16  GMLOS:   Avoidable Delays:   Code Status: full    Acute Medical Issue/Factors:   <principal problem not specified>       Discharge Barriers: slight improvement with orientation--still not consistently following commands. Not able to participate in video swallow. Goals of care attempted--needs to work with therapies--not acute rehab appropriate @ this time. Remains on iv heparin infusion. Expected discharge date: RBD  Expected next site of care: TBD    / available for discharge planning.

## 2022-08-26 NOTE — PROGRESS NOTES
Cardiology CODE BLUE Note  CODE BLUE called patient resuscitated have had recurrent CODE BLUE. Patient with multiple organ system failure stroke, respiratory failure, aspiration. Patient again had ACLS protocol CPR performed code has now lasted on and off for over an hour. Patient is on multiple pressors wide open. His shared untenable prognosis with family who are considering options.

## 2022-08-26 NOTE — PROGRESS NOTES
Max dose levophed, vasopressin, dopamine, epi gtts. Family remains at bedside. Eyes rhythmically moving side to side. Left pupil blown. Right pupil +6 nonreactive.

## 2022-08-26 NOTE — OCCUPATIONAL THERAPY NOTE
Attempted to see pt for skilled OT services this date. Pt with RR called, then code blue, pt intubated. Will re-attempt when/if appropriate for therapy services.  Sobeida Pederson, 08/26/22, 12:45 PM

## 2022-08-26 NOTE — PROGRESS NOTES
MORENITA RODRIGUEZ     Called to patient bedside for asystolic arrest. May have aspirated prior, though unwitnessed. CPR ongoing. ACLS protocol initiated, multiple rounds epi, bicarb. At one point thought possibly fine VF, defibrillated at 200 J and amiodarone was given. Eventually regained pulse, AF with IVCD rhythm. Quickly intubated. Echo bedside no effusion, low normal LV function. Pressors, inotropes started. Patient to be transferred to CCU. Critical care time > 70 minutes.

## 2022-08-26 NOTE — PLAN OF CARE
Received pt at approx 0700. Pt is A&Ox2, delayed responses, slow/soft speech,  complaints of 10/10 generalized pain. Pt is on 6L of 02 via NC, productive cough- YANELIS. Pt is in atrial fibrillation, frequent PVC's. Pt is incontinent of B&B, purewick in place, abdomen tender & distended, last BM 8-24. Pt has a pressure sore on his sacrum, red/brown/black, santyl applied, mepilex reinforced. Edema present on BUE/BLE/abdomen. Bruising on abdomen. Red skin tear on L upper thigh. Dobbhoff in place, TF running at 50 ml/hour. Patient updated with plan of care. Approx 1000- residual of 190 ml, notified Dr Bry Do. Per MD spoke with Lisa Perera regarding TF rates. Per Any- patient has had increased residual levels, will draw another at 1300, if remaining high will discuss decreasing TF rate. Approx 1133- found patient unresponsive, no pulse, foaming at the mouth, blue coloration, rhythm noted in room looked like V tach, Code Blue called, CPR started. Approx 1230- attempted to transfer patient to ICU, pulse lost, Code Blue called, CPR started. Approx 1330- patient transferred to ICU with Dr Julio Césra Paige, family present and with patient. Recorded documentation in paper chart.

## 2022-08-26 NOTE — PROGRESS NOTES
08/26/22 1331   Clinical Encounter Type   Visited With Family; Health care provider  (Significant other and her mother, son, stepson, and RN's)   Continue Visiting Yes   Crisis Visit Critical care   Referral From Other (Comment)  (Responded to code blue)   Family Spiritual Encounters   Family Support During Treatment Consistently   Taxonomy   Intended Effects Demonstrate caring and concern   Methods Collaborate with care team member;Encourage self care; Offer emotional support   Interventions Acknowledge current situation; Acknowledge response to difficult experience; Active listening; Ask guided questions; Explain  role; Facilitate communication between patient and/or family member and care team;Identify supportive relationship(s);Silent prayer;Provide hospitality;Provide compassionate touch   Provided a gentle, calm presence throughout the code blue for family and staff. Encouraged healthy, slowed breathing techniques for significant other as anxiety and fear were overwhelming for her at times. Facilitated interaction between team members to benefit family needs. For further spiritual care contact pager [phone] as needed.

## 2022-08-26 NOTE — PROGRESS NOTES
Decline tonight on how alert and oriented patient compared to last night. At start of shift patient seemed more alert. PAtient A&Ox2. Did not sleep at all tonight. Pt reported pain all night norco and morphine given. Keeps pulling on tele leads and almost ripped NG tube out. Constantly monitoring patient tonight will pass on to AM shift Hep drip therapeutic re draw tomrorow am. Tube feeding at goal rate 50mL. Pt will try to get up in chair today.

## 2022-08-26 NOTE — PROGRESS NOTES
Pt arrived to ICU after code blue. Multiple pressor agents wide open per Dr. Dena Arriaza at bedside. Family at bedside.

## 2022-08-26 NOTE — PHYSICAL THERAPY NOTE
Attempted pt for PT treatment session. Pt with RR called, then code blue, pt intubated. Will follow and re-attempt if/when appropriate.

## 2022-08-26 NOTE — PROCEDURES
Intubation Procedure Note     Indication: Cardiopulmonary arrest    Procedure:  Intubation method: GlideScope  Patient status: Unconscious  Preoxygenation: BVM  Pretreatment medication: None  Sedation: None  Paralytic: None  Laryngoscope size GlideScope 4  Tube size (mm): 8.0  Number of attempts: 1  View: Grade 1  Post-procedure assessment: CO2 detector with +color change. Equal breath sounds bilaterally  ETT to lip (cm): 21    Post-procedure CXR pending.

## 2022-08-26 NOTE — SLP NOTE
Attempted to see for treatment session however pt with RR called, then recurrent code blues and now intubated. Will follow and re-attempt if/when appropriate.     Emily Hogan MA, [id number] [facility]  Pager

## 2022-08-26 NOTE — PROGRESS NOTES
22 1507   Clinical Encounter Type   Visited With Family; Health care provider   Continue Visiting No   Crisis Visit Death   Patient's Supportive Strategies/Resources Family very supportive   Referral From Nurse   Family Spiritual Encounters   Family Coping Sadness   Family Support During Treatment Consistently   Taxonomy   Intended Effects Journeying with someone in the grief process   Methods Collaborate with care team member;Offer emotional support   Interventions Acknowledge current situation; Ask guided questions; Active listening;[place]oman;Assist with determining decision maker; Facilitate closure   Expressed condolences and offered grief support with presence and prayer. Provided grief packet. No  home decision at this time. Son was given phone # for [employer], to convey final arrangement information when it has been decided upon.

## 2022-08-31 NOTE — PAYOR COMM NOTE
Discharge Notification    Patient Name: Matti Daley  Payor: [facility] #: [id number]  Authorization Number: [id number]  Admit Date/Time: 8/9/2022 10:37 PM  Discharge Date/Time: 8/26/2022 6:17 PM

## 2025-05-02 NOTE — PHYSICAL THERAPY NOTE
PHYSICAL THERAPY TREATMENT NOTE - INPATIENT    Room Number: 287/708-N     Session: 1   Number of Visits to Meet Established Goals: 5     History related to current admission:   60 y/o w/ h/o DM, HTN, A-fib who presented w/ c/o Nausea fever and lethargy.  Aurelia Lax HOME.\"    Patient’s self-stated goal is to go home today.     OBJECTIVE       WEIGHT BEARING RESTRICTION  Weight Bearing Restriction: None                PAIN ASSESSMENT   Ratin  Location: L hip       BALANCE Pt in agreement to PT aide vending to pt this date. O2 sats at 96% upon return to room- unable to obtain while ambulating 2/2 poor pleth. Pt states is eager to go home, will go home this date.       Patient End of Session: Up in chair;Needs met;Call [facility] [[phone]]

## (undated) NOTE — ED AVS SNAPSHOT
Mike Roman   MRN: [id number]    Department:  [facility] Emergency Department   Date of Visit:  11/5/2019           Disclosure     Insurance plans vary and the physician(s) referred by the ER may not be covered by your plan.  Please contact yo tell this physician (or your personal doctor if your instructions are to return to your personal doctor) about any new or lasting problems. The primary care or specialist physician will see patients referred from the [facility] Emergency Department.  Hong Krishnamurthy